# Patient Record
Sex: FEMALE | Race: WHITE | Employment: OTHER | URBAN - METROPOLITAN AREA
[De-identification: names, ages, dates, MRNs, and addresses within clinical notes are randomized per-mention and may not be internally consistent; named-entity substitution may affect disease eponyms.]

---

## 2017-02-20 ENCOUNTER — ANESTHESIA EVENT (OUTPATIENT)
Dept: SURGERY | Age: 77
End: 2017-02-20
Payer: MEDICARE

## 2017-02-21 ENCOUNTER — ANESTHESIA (OUTPATIENT)
Dept: SURGERY | Age: 77
End: 2017-02-21
Payer: MEDICARE

## 2017-03-07 ENCOUNTER — SURGERY (OUTPATIENT)
Age: 77
End: 2017-03-07

## 2017-03-07 ENCOUNTER — HOSPITAL ENCOUNTER (OUTPATIENT)
Age: 77
Setting detail: OUTPATIENT SURGERY
Discharge: HOME | End: 2017-03-07
Attending: ORTHOPAEDIC SURGERY | Admitting: ORTHOPAEDIC SURGERY
Payer: MEDICARE

## 2017-03-07 VITALS
TEMPERATURE: 98.3 F | WEIGHT: 212.25 LBS | OXYGEN SATURATION: 94 % | DIASTOLIC BLOOD PRESSURE: 62 MMHG | RESPIRATION RATE: 16 BRPM | SYSTOLIC BLOOD PRESSURE: 128 MMHG | HEART RATE: 55 BPM | BODY MASS INDEX: 37.6 KG/M2

## 2017-03-07 DIAGNOSIS — G89.4 CHRONIC PAIN SYNDROME: ICD-10-CM

## 2017-03-07 LAB — POTASSIUM BLD-SCNC: 4.8 MMOL/L (ref 3.5–5.1)

## 2017-03-07 PROCEDURE — 77030018836 HC SOL IRR NACL ICUM -A: Performed by: ORTHOPAEDIC SURGERY

## 2017-03-07 PROCEDURE — 74011250636 HC RX REV CODE- 250/636: Performed by: ORTHOPAEDIC SURGERY

## 2017-03-07 PROCEDURE — 74011250636 HC RX REV CODE- 250/636: Performed by: ANESTHESIOLOGY

## 2017-03-07 PROCEDURE — 77030031139 HC SUT VCRL2 J&J -A: Performed by: ORTHOPAEDIC SURGERY

## 2017-03-07 PROCEDURE — 77030020754 HC CUF TRNQT 2BLA STRY -B: Performed by: ORTHOPAEDIC SURGERY

## 2017-03-07 PROCEDURE — 76210000063 HC OR PH I REC FIRST 0.5 HR: Performed by: ORTHOPAEDIC SURGERY

## 2017-03-07 PROCEDURE — 76210000020 HC REC RM PH II FIRST 0.5 HR: Performed by: ORTHOPAEDIC SURGERY

## 2017-03-07 PROCEDURE — 74011250637 HC RX REV CODE- 250/637: Performed by: ANESTHESIOLOGY

## 2017-03-07 PROCEDURE — 76060000032 HC ANESTHESIA 0.5 TO 1 HR: Performed by: ORTHOPAEDIC SURGERY

## 2017-03-07 PROCEDURE — 74011000250 HC RX REV CODE- 250: Performed by: ORTHOPAEDIC SURGERY

## 2017-03-07 PROCEDURE — 77030006605 HC BLD CRPL IN BIOM -C: Performed by: ORTHOPAEDIC SURGERY

## 2017-03-07 PROCEDURE — 76010000138 HC OR TIME 0.5 TO 1 HR: Performed by: ORTHOPAEDIC SURGERY

## 2017-03-07 PROCEDURE — 77030010507 HC ADH SKN DERMBND J&J -B: Performed by: ORTHOPAEDIC SURGERY

## 2017-03-07 PROCEDURE — 84132 ASSAY OF SERUM POTASSIUM: CPT

## 2017-03-07 PROCEDURE — 74011250636 HC RX REV CODE- 250/636

## 2017-03-07 RX ORDER — FAMOTIDINE 20 MG/1
20 TABLET, FILM COATED ORAL ONCE
Status: COMPLETED | OUTPATIENT
Start: 2017-03-07 | End: 2017-03-07

## 2017-03-07 RX ORDER — SODIUM CHLORIDE 0.9 % (FLUSH) 0.9 %
5-10 SYRINGE (ML) INJECTION EVERY 8 HOURS
Status: DISCONTINUED | OUTPATIENT
Start: 2017-03-07 | End: 2017-03-07 | Stop reason: HOSPADM

## 2017-03-07 RX ORDER — PROPOFOL 10 MG/ML
INJECTION, EMULSION INTRAVENOUS AS NEEDED
Status: DISCONTINUED | OUTPATIENT
Start: 2017-03-07 | End: 2017-03-07 | Stop reason: HOSPADM

## 2017-03-07 RX ORDER — HYDROMORPHONE HYDROCHLORIDE 2 MG/ML
0.5 INJECTION, SOLUTION INTRAMUSCULAR; INTRAVENOUS; SUBCUTANEOUS
Status: DISCONTINUED | OUTPATIENT
Start: 2017-03-07 | End: 2017-03-07 | Stop reason: HOSPADM

## 2017-03-07 RX ORDER — MIDAZOLAM HYDROCHLORIDE 1 MG/ML
2 INJECTION, SOLUTION INTRAMUSCULAR; INTRAVENOUS
Status: DISCONTINUED | OUTPATIENT
Start: 2017-03-07 | End: 2017-03-07 | Stop reason: HOSPADM

## 2017-03-07 RX ORDER — LIDOCAINE HYDROCHLORIDE 5 MG/ML
INJECTION, SOLUTION INFILTRATION; INTRAVENOUS AS NEEDED
Status: DISCONTINUED | OUTPATIENT
Start: 2017-03-07 | End: 2017-03-07 | Stop reason: HOSPADM

## 2017-03-07 RX ORDER — CEFAZOLIN SODIUM IN 0.9 % NACL 2 G/50 ML
2 INTRAVENOUS SOLUTION, PIGGYBACK (ML) INTRAVENOUS ONCE
Status: COMPLETED | OUTPATIENT
Start: 2017-03-07 | End: 2017-03-07

## 2017-03-07 RX ORDER — OXYCODONE AND ACETAMINOPHEN 10; 325 MG/1; MG/1
1 TABLET ORAL AS NEEDED
Status: DISCONTINUED | OUTPATIENT
Start: 2017-03-07 | End: 2017-03-07 | Stop reason: HOSPADM

## 2017-03-07 RX ORDER — LIDOCAINE HYDROCHLORIDE 10 MG/ML
0.3 INJECTION INFILTRATION; PERINEURAL ONCE
Status: DISCONTINUED | OUTPATIENT
Start: 2017-03-07 | End: 2017-03-07 | Stop reason: HOSPADM

## 2017-03-07 RX ORDER — LIDOCAINE HYDROCHLORIDE AND EPINEPHRINE 10; 10 MG/ML; UG/ML
INJECTION, SOLUTION INFILTRATION; PERINEURAL AS NEEDED
Status: DISCONTINUED | OUTPATIENT
Start: 2017-03-07 | End: 2017-03-07 | Stop reason: HOSPADM

## 2017-03-07 RX ORDER — SODIUM CHLORIDE, SODIUM LACTATE, POTASSIUM CHLORIDE, CALCIUM CHLORIDE 600; 310; 30; 20 MG/100ML; MG/100ML; MG/100ML; MG/100ML
100 INJECTION, SOLUTION INTRAVENOUS CONTINUOUS
Status: DISCONTINUED | OUTPATIENT
Start: 2017-03-07 | End: 2017-03-07 | Stop reason: HOSPADM

## 2017-03-07 RX ORDER — HYDROCODONE BITARTRATE AND ACETAMINOPHEN 10; 325 MG/1; MG/1
1 TABLET ORAL
Qty: 20 TAB | Refills: 0 | Status: SHIPPED | OUTPATIENT
Start: 2017-03-07 | End: 2017-03-09 | Stop reason: SDUPTHER

## 2017-03-07 RX ORDER — OXYCODONE HYDROCHLORIDE 5 MG/1
5 TABLET ORAL
Status: DISCONTINUED | OUTPATIENT
Start: 2017-03-07 | End: 2017-03-07 | Stop reason: HOSPADM

## 2017-03-07 RX ORDER — SODIUM CHLORIDE 0.9 % (FLUSH) 0.9 %
5-10 SYRINGE (ML) INJECTION AS NEEDED
Status: DISCONTINUED | OUTPATIENT
Start: 2017-03-07 | End: 2017-03-07 | Stop reason: HOSPADM

## 2017-03-07 RX ORDER — SODIUM CHLORIDE, SODIUM LACTATE, POTASSIUM CHLORIDE, CALCIUM CHLORIDE 600; 310; 30; 20 MG/100ML; MG/100ML; MG/100ML; MG/100ML
75 INJECTION, SOLUTION INTRAVENOUS CONTINUOUS
Status: DISCONTINUED | OUTPATIENT
Start: 2017-03-07 | End: 2017-03-07 | Stop reason: HOSPADM

## 2017-03-07 RX ORDER — HYDROCODONE BITARTRATE AND ACETAMINOPHEN 10; 325 MG/1; MG/1
1 TABLET ORAL
Qty: 240 TAB | Refills: 0 | Status: SHIPPED | OUTPATIENT
Start: 2017-03-07 | End: 2017-03-07

## 2017-03-07 RX ORDER — OXYCODONE HYDROCHLORIDE 5 MG/1
10 TABLET ORAL
Status: DISCONTINUED | OUTPATIENT
Start: 2017-03-07 | End: 2017-03-07 | Stop reason: HOSPADM

## 2017-03-07 RX ORDER — PROPOFOL 10 MG/ML
INJECTION, EMULSION INTRAVENOUS
Status: DISCONTINUED | OUTPATIENT
Start: 2017-03-07 | End: 2017-03-07 | Stop reason: HOSPADM

## 2017-03-07 RX ADMIN — SODIUM CHLORIDE, SODIUM LACTATE, POTASSIUM CHLORIDE, AND CALCIUM CHLORIDE 75 ML/HR: 600; 310; 30; 20 INJECTION, SOLUTION INTRAVENOUS at 10:20

## 2017-03-07 RX ADMIN — CEFAZOLIN 2 G: 1 INJECTION, POWDER, FOR SOLUTION INTRAMUSCULAR; INTRAVENOUS; PARENTERAL at 11:42

## 2017-03-07 RX ADMIN — PROPOFOL 100 MCG/KG/MIN: 10 INJECTION, EMULSION INTRAVENOUS at 11:31

## 2017-03-07 RX ADMIN — LIDOCAINE HYDROCHLORIDE 30 ML: 5 INJECTION, SOLUTION INFILTRATION; INTRAVENOUS at 11:36

## 2017-03-07 RX ADMIN — LIDOCAINE HYDROCHLORIDE AND EPINEPHRINE 4 ML: 10; 10 INJECTION, SOLUTION INFILTRATION; PERINEURAL at 11:47

## 2017-03-07 RX ADMIN — FAMOTIDINE 20 MG: 20 TABLET ORAL at 10:20

## 2017-03-07 RX ADMIN — PROPOFOL 30 MG: 10 INJECTION, EMULSION INTRAVENOUS at 11:31

## 2017-03-07 RX ADMIN — PROPOFOL 20 MG: 10 INJECTION, EMULSION INTRAVENOUS at 11:46

## 2017-03-07 NOTE — ANESTHESIA PREPROCEDURE EVALUATION
Anesthetic History   No history of anesthetic complications            Review of Systems / Medical History  Patient summary reviewed and pertinent labs reviewed    Pulmonary    COPD: mild               Neuro/Psych   Within defined limits           Cardiovascular    Hypertension: well controlled      CHF: NYHA Classification I        Exercise tolerance: >4 METS     GI/Hepatic/Renal     GERD: well controlled    Renal disease: CRI       Endo/Other        Morbid obesity and arthritis     Other Findings            Physical Exam    Airway  Mallampati: II  TM Distance: > 6 cm  Neck ROM: normal range of motion   Mouth opening: Normal     Cardiovascular    Rhythm: regular           Dental    Dentition: Upper partial plate     Pulmonary                 Abdominal  GI exam deferred       Other Findings            Anesthetic Plan    ASA: 3  Anesthesia type: regional - Harshil block          Induction: Intravenous  Anesthetic plan and risks discussed with: Patient

## 2017-03-07 NOTE — ANESTHESIA POSTPROCEDURE EVALUATION
Post-Anesthesia Evaluation and Assessment    Patient: Ronaldo Almonte MRN: [id number]  SSN: [id number]    YOB: 1940  Age: 64 y.o. Sex: female       Cardiovascular Function/Vital Signs  Visit Vitals    /62    Pulse (!) 55    Temp 36.8 °C (98.3 °F)    Resp 16    Wt 96.3 kg (212 lb 4 oz)    SpO2 94%    BMI 37.6 kg/m2       Patient is status post regional anesthesia for Procedure(s):  HAND CARPAL TUNNEL RELEASE/ RIGHT. Nausea/Vomiting: None    Postoperative hydration reviewed and adequate. Pain:  Pain Scale 1: Numeric (0 - 10) (03/07/17 1222)  Pain Intensity 1: 0 (03/07/17 1222)   Managed    Neurological Status:   Neuro (WDL): Within Defined Limits (03/07/17 1222)  Neuro  Neurologic State: Alert (03/07/17 1222)  LUE Motor Response: Purposeful (03/07/17 1222)  LLE Motor Response: Purposeful (03/07/17 1222)  RUE Motor Response: Numbness; Pharmocologically paralyzed (03/07/17 1222)  RLE Motor Response: Purposeful (03/07/17 1222)       Mental Status and Level of Consciousness: Awake. Pulmonary Status:   O2 Device: Room air (03/07/17 1228)   Adequate oxygenation and airway patent    Complications related to anesthesia: None    Post-anesthesia assessment completed.  No concerns    Signed By: Billy Vance MD     March 7, 2017

## 2017-03-07 NOTE — ANESTHESIA PROCEDURE NOTES
Peripheral Block    Start time: 3/7/2017 11:33 AM  End time: 3/7/2017 11:37 AM  Performed by: Talita Whitman  Authorized by: Veena Catalan       Pre-procedure:    Indications: primary anesthetic    Preanesthetic Checklist: patient identified, risks and benefits discussed, site marked, timeout performed, anesthesia consent given and patient being monitored    Timeout Time: 11:33          Block Type:   Block Type:  Harshil block  Laterality:  Right  Patient Position:  Flat  Block Limb IV Checked: Yes    Esmarch exsanguination: Yes    Tourniquet Type:  Single  Tourniquet Location:  Right arm  Tourniquet Inflated:  3/7/2017 11:34 AM  Inflation (mmHg):  250  Limb w/o Radial Pulse: Yes    Infused Agent:  Lidocaine 0.5%  Volume Infused (mL):  30  Tourniquet Deflated:  3/7/2017 11:59 AM    Assessment:    Injection Assessment:   Patient tolerance:  Patient tolerated the procedure well with no immediate complications

## 2017-03-07 NOTE — OP NOTES
Carpal Tunnel Release    Milton Liang     3/7/2017    Indications: This is a 64 y.o. female who presents with right carpal tunnel syndrome. The patient was admitted for surgery as conservative measures have failed. Pre-operative Diagnosis:  RIGHT CARPAL TUNNEL SYNDROME    Post-operative Diagnosis:  RIGHT CARPAL TUNNEL SYNDROME    Procedure:   RIGHT CARPAL TUNNEL RELEASE    Surgeon: Yolanda Bellamy MD    Anesthesia:   IV Regional    Procedure/Operative Note:  Appropriate informed consent was obtained previously in the office. The patient was given IV prophylactic antibiotics. A Harshil block was given in the  right upper extremity. THe extremity was prepped and draped as a sterile field. A timeout was completed identifying the patient, procedure site and surgeon. Once confirmed by the operative team we proceeded. A slightly curved longitudinal incision was then made in the  right proximal palm. Dissection was carried down to the subcutaneous tissue. A small self retaining retractor was inserted and the palmar fascia opened with the tip of the scalpel. The transverse ligament was incised over 5-6 mm at the distal margin and the wrist then moderately extended over a folded towel.,  The SEWORKS system was used to complete the release. First the single  was passed deep to the deep ligament into the carpal canal.  This was followed by the stripper instrument and then the double . Finally the ligamentatome was used to release the ligament from distal to proximal with a single smooth pass. The completeness of the release was checked by palpating with the single  as well as visually. The median nerve was intact and no masses or other abnormalities noted. Small fascial bands distally were released with the tenotomy scissors. The margins of the canal were injected with 1% Lidocaine with epinephrine. The incision was closed with 4-0 Vicryl Rapide and surgical adhesive.   Sterile dressings were applied. The patient tolerated the procedure well and was sent to the  in good condition.      Tourniquet Time:   Total Tourniquet Time Documented:  Forearm (Right) - 24 minutes  Total: Forearm (Right) - 24 minutes          Signed By: Jennifer Taylor MD

## 2017-03-07 NOTE — IP AVS SNAPSHOT
[facility] 
 
 
 [address] 
[phone] Patient: Thiago Hoff MRN: [id number] XES:4/7/0210 You are allergic to the following Allergen Reactions Latex Rash Altace (Ramipril) Cough Ambien (Zolpidem) Other (comments) Bad dreams Codeine Phosphate Other (comments) Hives, can't breathe Lescol Xl (Fluvastatin) Other (comments) \"also caused troubles\" Lipitor (Atorvastatin) Unknown (comments) \"Can't take even low doses\" Micardis (Telmisartan) Vertigo Oxybutynin Chloride Other (comments) \"Dried mouth for 1 week\" Praluent Pen (Alirocumab) Other (comments) Runny nose, irritation around the nose Vytorin 10-10 (Ezetimibe-Simvastatin) Other (comments) Upper abd stomach cramps Zetia (Ezetimibe) Diarrhea Zocor (Simvastatin) Other (comments) Abd cramps Recent Documentation Weight BMI OB Status Smoking Status 96.3 kg 37.6 kg/m2 Menopause Former Smoker Emergency Contacts Name Discharge Info Relation Home Work Mobile Candida Awan  Friend [5] [phone] About your hospitalization You were admitted on:  March 7, 2017 [facility] You were discharged on:  March 7, 2017 Unit phone number:  [phone] Why you were hospitalized Your primary diagnosis was:  Not on File Providers Seen During Your Hospitalizations Provider Role Specialty Primary office phone Campos Clark MD Attending Provider Orthopedic Surgery [phone] Your Primary Care Physician (PCP) Primary Care Physician Office Phone Office Fax 6282 [address] [phone] Follow-up Information Follow up With Details Comments Contact Info Doreen Venegas MD   [address] [facility] 
 Adama mcguire [address] [place] [address] 
[phone] Your Appointments Thursday March 09, 2017  9:30 AM EST Office Visit with Geovanny Guerra MD  
[address] [place] [address] ([address] [place] [address]) [address] [facility] Adama mcguire [address] [place] [address]  
[phone] Current Discharge Medication List  
  
CONTINUE these medications which have CHANGED Dose & Instructions Dispensing Information Comments Morning Noon Evening Bedtime  
 bisoprolol 5 mg tablet Commonly known as:  Clearance Kidd What changed:  when to take this Your next dose is: Today, Tomorrow Other:  _________ Dose:  5 mg Take 1 Tab by mouth daily. Indications: Hypertension Quantity:  90 Tab Refills:  3 HYDROcodone-acetaminophen  mg tablet Commonly known as:  Martha Fothergill What changed:   
- how much to take - when to take this Your next dose is: Today, Tomorrow Other:  _________ Dose:  1 Tab Take 1 Tab by mouth every four (4) hours as needed for Pain. Max Daily Amount: 6 Tabs. Quantity:  20 Tab Refills:  0  
     
   
   
   
  
 magnesium oxide 400 mg tablet Commonly known as:  MAG-OX What changed:  when to take this Your next dose is: Today, Tomorrow Other:  _________ Dose:  400 mg Take 1 Tab by mouth daily. Quantity:  90 Tab Refills:  3  
     
   
   
   
  
 nystatin powder Commonly known as:  NYSTOP What changed:  additional instructions Your next dose is: Today, Tomorrow Other:  _________ Apply  to affected area four (4) times daily. Quantity:  120 g Refills:  11  
     
   
   
   
  
 phentermine 37.5 mg tablet Commonly known as:  ADIPEX-P What changed:  additional instructions Your next dose is: Today, Tomorrow Other:  _________ Dose:  37.5 mg Take 1 Tab by mouth every morning. Max Daily Amount: 37.5 mg.  
 Quantity:  30 Tab Refills:  2 CONTINUE these medications which have NOT CHANGED Dose & Instructions Dispensing Information Comments Morning Noon Evening Bedtime ALPRAZolam 2 mg tablet Commonly known as:  Yasir Smith Your next dose is: Today, Tomorrow Other:  _________ Dose:  2-4 mg Take 1-2 Tabs by mouth nightly as needed. Max Daily Amount: 4 mg. Quantity:  60 Tab Refills:  5  
     
   
   
   
  
 amLODIPine 5 mg tablet Commonly known as:  Andrés Mend Your next dose is: Today, Tomorrow Other:  _________ TK 1 T PO DAILY TO LOWER BLOOD PRESSURE  hs  
 Refills:  4  
     
   
   
   
  
 aspirin 81 mg tablet Your next dose is: Today, Tomorrow Other:  _________ Dose:  81 mg Take 81 mg by mouth nightly. Last dose 2/19/17 Refills:  0  
     
   
   
   
  
 BIOTIN PO Your next dose is: Today, Tomorrow Other:  _________ Dose:  5000 mcg Take 5,000 mcg by mouth. 1 tablet daily Refills:  0  
     
   
   
   
  
 calcium carbonate 500 mg calcium (1,250 mg) tablet Commonly known as:  OS-ROSALIA Your next dose is: Today, Tomorrow Other:  _________ Take  by mouth daily. Refills:  0  
     
   
   
   
  
 coconut oil 1,000 mg Cap Your next dose is: Today, Tomorrow Other:  _________ Dose:  1000 mg Take 1,000 mg by mouth. 1 capsule daily   Last dose 2/19/17 Refills:  0  
     
   
   
   
  
 FISH OIL PO Your next dose is: Today, Tomorrow Other:  _________ Dose:  1400 mg Take 1,400 mg by mouth. Fish Oil Triple Strength, 1 capsule in the morning and 1 in the evening   Last dose 2/19/17 Refills:  0  
     
   
   
   
  
 furosemide 40 mg tablet Commonly known as:  LASIX Your next dose is: Today, Tomorrow Other:  _________ Dose:  40 mg Take 40 mg by mouth every morning. Refills:  0 L-LYSINE PO Your next dose is: Today, Tomorrow Other:  _________ Dose:  1000 mg Take 1,000 mg by mouth. One tablet daily Refills:  0 MEGARED OMEGA-3 KRILL OIL PO Your next dose is: Today, Tomorrow Other:  _________ Take  by mouth every morning. Last dose 2/19/17 Refills:  0  
     
   
   
   
  
 ondansetron hcl 4 mg tablet Commonly known as:  Sil Dice Your next dose is: Today, Tomorrow Other:  _________ Dose:  4 mg Take 4 mg by mouth three (3) times daily as needed. Refills:  0  
     
   
   
   
  
 REPATHA SURECLICK pen injection Generic drug:  evolocumab Your next dose is: Today, Tomorrow Other:  _________  
   
   
 by SubCUTAneous route. Every other week  Indications: hypercholesterolemia Refills:  0  
     
   
   
   
  
 VITAMIN D3 1,000 unit tablet Generic drug:  cholecalciferol Your next dose is: Today, Tomorrow Other:  _________ Dose:  1000 Units Take 1,000 Units by mouth daily. Refills:  0  
     
   
   
   
  
 vitamin E 400 unit capsule Commonly known as:  [address] Your next dose is: Today, Tomorrow Other:  _________ Take  by mouth daily. Last dose 2/19/17 Refills:  0 Where to Get Your Medications Information on where to get these meds will be given to you by the nurse or doctor. ! Ask your nurse or doctor about these medications HYDROcodone-acetaminophen  mg tablet Discharge Instructions POST OP INSTRUCTIONS 1. Patient has appointment for 3/17/17 at 9:50 AM at the [address] [place] [address]. 2.  For problems call Dr Familia Hahn, [facility],  [phone] Appointments only,  [phone] 
 
3. Ice and elevate the surgical site. 4.  May remove dressings and wash in running water or shower.   Then cover with a Band-aid. Do not submerge in bath or dish water. ACTIVITY · As tolerated and as directed by your doctor. · Bathe or shower as directed by your doctor. DIET · Clear liquids until no nausea or vomiting; then light diet for the first day. · Advance to regular diet on second day, unless your doctor orders otherwise. · If nausea and vomiting continues, call your doctor. PAIN 
· Take pain medication as directed by your doctor. · Call your doctor if pain is NOT relieved by medication. · DO NOT take aspirin of blood thinners unless directed by your doctor. DRESSING CARE  
 
 
CALL YOUR DOCTOR IF  
· Excessive bleeding that does not stop after holding pressure over the area · Temperature of 101 degrees F or above · Excessive redness, swelling or bruising, and/ or green or yellow, smelly discharge from incision AFTER ANESTHESIA · For the first 24 hours: DO NOT Drive, Drink alcoholic beverages, or Make important decisions. · Be aware of dizziness following anesthesia and while taking pain medication. APPOINTMENT DATE/ TIME 
 
YOUR DOCTOR'S PHONE NUMBER  
 
 
DISCHARGE SUMMARY from Nurse PATIENT INSTRUCTIONS: 
 
After general anesthesia or intravenous sedation, for 24 hours or while taking prescription Narcotics: · Limit your activities · Do not drive and operate hazardous machinery · Do not make important personal or business decisions · Do  not drink alcoholic beverages · If you have not urinated within 8 hours after discharge, please contact your surgeon on call. *  Please give a list of your current medications to your Primary Care Provider. *  Please update this list whenever your medications are discontinued, doses are 
    changed, or new medications (including over-the-counter products) are added. *  Please carry medication information at all times in case of emergency situations. These are general instructions for a healthy lifestyle: No smoking/ No tobacco products/ Avoid exposure to second hand smoke Surgeon General's Warning:  Quitting smoking now greatly reduces serious risk to your health. Obesity, smoking, and sedentary lifestyle greatly increases your risk for illness A healthy diet, regular physical exercise & weight monitoring are important for maintaining a healthy lifestyle You may be retaining fluid if you have a history of heart failure or if you experience any of the following symptoms:  Weight gain of 3 pounds or more overnight or 5 pounds in a week, increased swelling in our hands or feet or shortness of breath while lying flat in bed. Please call your doctor as soon as you notice any of these symptoms; do not wait until your next office visit. Recognize signs and symptoms of STROKE: 
 
F-face looks uneven A-arms unable to move or move unevenly S-speech slurred or non-existent T-time-call 911 as soon as signs and symptoms begin-DO NOT go Back to bed or wait to see if you get better-TIME IS BRAIN. Discharge Orders None Introducing [facility]! Dear Karen Justice: 
Thank you for requesting a [employer] account. Our records indicate that you already have an active [employer] account. You can access your account anytime at [email or web address] Did you know that you can access your hospital and ER discharge instructions at any time in [employer]? You can also review all of your test results from your hospital stay or ER visit. Additional Information If you have questions, please visit the Frequently Asked Questions section of the [employer] website at [email or web address] Remember, [employer] is NOT to be used for urgent needs. For medical emergencies, dial 911. Now available from your iPhone and Android! General Information Please provide this summary of care documentation to your next provider. Patient Signature:  ____________________________________________________________ Date:  ____________________________________________________________  
  
Henrry Saskia Provider Signature:  ____________________________________________________________ Date:  ____________________________________________________________

## 2017-03-07 NOTE — DISCHARGE INSTRUCTIONS
POST OP INSTRUCTIONS      1. Patient has appointment for 3/17/17 at 9:50 AM at the [address] [place] [address]. 2.  For problems call Dr Sergei Zhou, [address] [facility] [address],  [phone]          Appointments only,  [phone]    3. Ice and elevate the surgical site. 4.  May remove dressings and wash in running water or shower. Then cover with a Band-aid. Do not submerge in bath or dish water. ACTIVITY  · As tolerated and as directed by your doctor. · Bathe or shower as directed by your doctor. DIET  · Clear liquids until no nausea or vomiting; then light diet for the first day. · Advance to regular diet on second day, unless your doctor orders otherwise. · If nausea and vomiting continues, call your doctor. PAIN  · Take pain medication as directed by your doctor. · Call your doctor if pain is NOT relieved by medication. · DO NOT take aspirin of blood thinners unless directed by your doctor. DRESSING CARE       CALL YOUR DOCTOR IF   · Excessive bleeding that does not stop after holding pressure over the area  · Temperature of 101 degrees F or above  · Excessive redness, swelling or bruising, and/ or green or yellow, smelly discharge from incision    AFTER ANESTHESIA   · For the first 24 hours: DO NOT Drive, Drink alcoholic beverages, or Make important decisions. · Be aware of dizziness following anesthesia and while taking pain medication. APPOINTMENT DATE/ TIME    YOUR DOCTOR'S PHONE NUMBER       DISCHARGE SUMMARY from Nurse    PATIENT INSTRUCTIONS:    After general anesthesia or intravenous sedation, for 24 hours or while taking prescription Narcotics:  · Limit your activities  · Do not drive and operate hazardous machinery  · Do not make important personal or business decisions  · Do  not drink alcoholic beverages  · If you have not urinated within 8 hours after discharge, please contact your surgeon on call.     *  Please give a list of your current medications to your Primary Care Provider. *  Please update this list whenever your medications are discontinued, doses are      changed, or new medications (including over-the-counter products) are added. *  Please carry medication information at all times in case of emergency situations. These are general instructions for a healthy lifestyle:    No smoking/ No tobacco products/ Avoid exposure to second hand smoke    Surgeon General's Warning:  Quitting smoking now greatly reduces serious risk to your health. Obesity, smoking, and sedentary lifestyle greatly increases your risk for illness    A healthy diet, regular physical exercise & weight monitoring are important for maintaining a healthy lifestyle    You may be retaining fluid if you have a history of heart failure or if you experience any of the following symptoms:  Weight gain of 3 pounds or more overnight or 5 pounds in a week, increased swelling in our hands or feet or shortness of breath while lying flat in bed. Please call your doctor as soon as you notice any of these symptoms; do not wait until your next office visit. Recognize signs and symptoms of STROKE:    F-face looks uneven    A-arms unable to move or move unevenly    S-speech slurred or non-existent    T-time-call 911 as soon as signs and symptoms begin-DO NOT go       Back to bed or wait to see if you get better-TIME IS BRAIN.

## 2017-05-17 ENCOUNTER — HOSPITAL ENCOUNTER (OUTPATIENT)
Dept: MAMMOGRAPHY | Age: 77
Discharge: HOME | End: 2017-05-17
Attending: FAMILY MEDICINE
Payer: MEDICARE

## 2017-05-17 DIAGNOSIS — Z12.31 VISIT FOR SCREENING MAMMOGRAM: ICD-10-CM

## 2017-05-17 PROCEDURE — 77067 SCR MAMMO BI INCL CAD: CPT

## 2017-05-24 ENCOUNTER — HOSPITAL ENCOUNTER (OUTPATIENT)
Dept: MAMMOGRAPHY | Age: 77
Discharge: HOME | End: 2017-05-24
Attending: FAMILY MEDICINE
Payer: MEDICARE

## 2017-05-24 DIAGNOSIS — N64.89 BREAST ASYMMETRY: ICD-10-CM

## 2017-05-24 DIAGNOSIS — R92.8 ABNORMAL SCREENING MAMMOGRAM: ICD-10-CM

## 2017-05-24 PROCEDURE — 77065 DX MAMMO INCL CAD UNI: CPT

## 2017-05-24 PROCEDURE — 76642 ULTRASOUND BREAST LIMITED: CPT

## 2017-05-30 ENCOUNTER — HOSPITAL ENCOUNTER (OUTPATIENT)
Dept: MAMMOGRAPHY | Age: 77
Discharge: HOME | End: 2017-05-30
Attending: FAMILY MEDICINE
Payer: MEDICARE

## 2017-05-30 VITALS
HEART RATE: 74 BPM | TEMPERATURE: 96.6 F | OXYGEN SATURATION: 94 % | DIASTOLIC BLOOD PRESSURE: 65 MMHG | SYSTOLIC BLOOD PRESSURE: 140 MMHG

## 2017-05-30 DIAGNOSIS — C50.911 BREAST CANCER, RIGHT BREAST (HCC): ICD-10-CM

## 2017-05-30 PROCEDURE — 74011250636 HC RX REV CODE- 250/636: Performed by: FAMILY MEDICINE

## 2017-05-30 PROCEDURE — 77030019999 MAM STEREO VAC  BX BREAST RT 1ST LESION W/CLIP AND SPECIMEN

## 2017-05-30 PROCEDURE — 74011000250 HC RX REV CODE- 250: Performed by: FAMILY MEDICINE

## 2017-05-30 PROCEDURE — 88305 TISSUE EXAM BY PATHOLOGIST: CPT | Performed by: FAMILY MEDICINE

## 2017-05-30 PROCEDURE — 77065 DX MAMMO INCL CAD UNI: CPT

## 2017-05-30 RX ORDER — LIDOCAINE HYDROCHLORIDE 10 MG/ML
10 INJECTION INFILTRATION; PERINEURAL
Status: COMPLETED | OUTPATIENT
Start: 2017-05-30 | End: 2017-05-30

## 2017-05-30 RX ORDER — LIDOCAINE HYDROCHLORIDE AND EPINEPHRINE 10; 10 MG/ML; UG/ML
1.5 INJECTION, SOLUTION INFILTRATION; PERINEURAL
Status: COMPLETED | OUTPATIENT
Start: 2017-05-30 | End: 2017-05-30

## 2017-05-30 RX ORDER — LIDOCAINE HYDROCHLORIDE AND EPINEPHRINE 10; 10 MG/ML; UG/ML
10 INJECTION, SOLUTION INFILTRATION; PERINEURAL
Status: COMPLETED | OUTPATIENT
Start: 2017-05-30 | End: 2017-05-30

## 2017-05-30 RX ADMIN — SODIUM CHLORIDE 250 ML: 900 INJECTION, SOLUTION INTRAVENOUS at 10:11

## 2017-05-30 RX ADMIN — LIDOCAINE HYDROCHLORIDE,EPINEPHRINE BITARTRATE 10 ML: 10; .01 INJECTION, SOLUTION INFILTRATION; PERINEURAL at 10:10

## 2017-05-30 RX ADMIN — LIDOCAINE HYDROCHLORIDE 6 ML: 10 INJECTION, SOLUTION INFILTRATION; PERINEURAL at 10:09

## 2018-05-29 ENCOUNTER — HOSPITAL ENCOUNTER (OUTPATIENT)
Dept: MAMMOGRAPHY | Age: 78
Discharge: HOME | End: 2018-05-29
Attending: FAMILY MEDICINE
Payer: MEDICARE

## 2018-05-29 DIAGNOSIS — Z12.31 ENCOUNTER FOR SCREENING MAMMOGRAM FOR MALIGNANT NEOPLASM OF BREAST: ICD-10-CM

## 2018-05-29 PROCEDURE — 77067 SCR MAMMO BI INCL CAD: CPT

## 2018-06-19 ENCOUNTER — HOSPITAL ENCOUNTER (OUTPATIENT)
Dept: LAB | Age: 78
Discharge: HOME | End: 2018-06-19

## 2018-06-19 PROCEDURE — 88305 TISSUE EXAM BY PATHOLOGIST: CPT | Performed by: INTERNAL MEDICINE

## 2018-11-03 ENCOUNTER — HOSPITAL ENCOUNTER (EMERGENCY)
Age: 78
Discharge: HOME | End: 2018-11-03
Attending: EMERGENCY MEDICINE
Payer: MEDICARE

## 2018-11-03 ENCOUNTER — APPOINTMENT (OUTPATIENT)
Dept: GENERAL RADIOLOGY | Age: 78
End: 2018-11-03
Attending: NURSE PRACTITIONER
Payer: MEDICARE

## 2018-11-03 VITALS
HEIGHT: 63 IN | SYSTOLIC BLOOD PRESSURE: 158 MMHG | HEART RATE: 75 BPM | TEMPERATURE: 98 F | BODY MASS INDEX: 38.8 KG/M2 | DIASTOLIC BLOOD PRESSURE: 73 MMHG | WEIGHT: 219 LBS | RESPIRATION RATE: 20 BRPM | OXYGEN SATURATION: 96 %

## 2018-11-03 DIAGNOSIS — M54.2 NECK PAIN ON LEFT SIDE: Primary | ICD-10-CM

## 2018-11-03 PROCEDURE — 99283 EMERGENCY DEPT VISIT LOW MDM: CPT | Performed by: NURSE PRACTITIONER

## 2018-11-03 PROCEDURE — 72050 X-RAY EXAM NECK SPINE 4/5VWS: CPT

## 2018-11-03 RX ORDER — LIDOCAINE 50 MG/G
PATCH TOPICAL
Qty: 15 EACH | Refills: 0 | Status: SHIPPED | OUTPATIENT
Start: 2018-11-03 | End: 2021-11-04

## 2018-11-03 RX ORDER — DIAZEPAM 2 MG/1
2 TABLET ORAL
Qty: 14 TAB | Refills: 0 | Status: SHIPPED | OUTPATIENT
Start: 2018-11-03 | End: 2020-01-14

## 2018-11-03 NOTE — DISCHARGE INSTRUCTIONS
Rest, use the Lidoderm patch as prescribed as needed for pain and urinary. Take the Valium as prescribed to help alleviate muscle pain. Do not take the Valium at the same time your taking Xanax, I recommend taking it at lunch and in the morning. Follow up with her primary doctor as discussed for further evaluation and treatment. Return to the ED for any new or worsening symptoms, including difficulty moving her neck, chest pain, left arm pain, or passing out. Neck Pain: Care Instructions  Your Care Instructions    You can have neck pain anywhere from the bottom of your head to the top of your shoulders. It can spread to the upper back or arms. Injuries, painting a ceiling, sleeping with your neck twisted, staying in one position for too long, and many other activities can cause neck pain. Most neck pain gets better with home care. Your doctor may recommend medicine to relieve pain or relax your muscles. He or she may suggest exercise and physical therapy to increase flexibility and relieve stress. You may need to wear a special (cervical) collar to support your neck for a day or two. Follow-up care is a key part of your treatment and safety. Be sure to make and go to all appointments, and call your doctor if you are having problems. It's also a good idea to know your test results and keep a list of the medicines you take. How can you care for yourself at home? · Try using a heating pad on a low or medium setting for 15 to 20 minutes every 2 or 3 hours. Try a warm shower in place of one session with the heating pad. · You can also try an ice pack for 10 to 15 minutes every 2 to 3 hours. Put a thin cloth between the ice and your skin. · Take pain medicines exactly as directed. ¨ If the doctor gave you a prescription medicine for pain, take it as prescribed. ¨ If you are not taking a prescription pain medicine, ask your doctor if you can take an over-the-counter medicine.   · If your doctor recommends a cervical collar, wear it exactly as directed. When should you call for help? Call your doctor now or seek immediate medical care if:  ? · You have new or worsening numbness in your arms, buttocks or legs. ? · You have new or worsening weakness in your arms or legs. (This could make it hard to stand up.)   ? · You lose control of your bladder or bowels. ? Watch closely for changes in your health, and be sure to contact your doctor if:  ? · Your neck pain is getting worse. ? · You are not getting better after 1 week. ? · You do not get better as expected. Where can you learn more? Go to http://veronica-clarence.info/. Enter [id number] in the search box to learn more about \"Neck Pain: Care Instructions. \"  Current as of: March 21, 2017  Content Version: 11.5  © 5701-5811 [employer]. Care instructions adapted under license by [employer] (which disclaims liability or warranty for this information). If you have questions about a medical condition or this instruction, always ask your healthcare professional. [address] any warranty or liability for your use of this information.

## 2018-11-03 NOTE — ED NOTES
I have reviewed discharge instructions with the patient. The patient verbalized understanding. Patient left ED via Discharge Method: ambulatory to Home with self Opportunity for questions and clarification provided. Patient given 2 scripts. To continue your aftercare when you leave the hospital, you may receive an automated call from our care team to check in on how you are doing. This is a free service and part of our promise to provide the best care and service to meet your aftercare needs.  If you have questions, or wish to unsubscribe from this service please call [phone]. Thank you for Choosing our [facility] Emergency Department.

## 2018-11-03 NOTE — ED PROVIDER NOTES
This patient presents to the ED complaining of left cervical neck pain ongoing for a week, and denies any known injury. She states that she has tried multiple over-the-counter therapies, including icy hot and BenGay, and has tried taking Flexeril and Norco, without any relief of her symptoms. The history is provided by the patient. No  was used. Neck Pain This is a new problem. The current episode started more than 2 days ago. The problem occurs constantly. The problem has been gradually worsening. The pain is associated with nothing. There has been no fever. The pain is present in the left side. The quality of the pain is described as aching and cramping. The pain radiates to the left scapula and left shoulder. The pain is at a severity of 8/10. The pain is severe. The symptoms are aggravated by certain positions. The pain is worse during the night. Stiffness is present all day. Pertinent negatives include no chest pain, no syncope, no numbness, no headaches, no leg pain and no tingling. She has tried ice, heat, muscle relaxants, NSAIDs and analgesics for the symptoms. The treatment provided no relief. Past Medical History:  
Diagnosis Date  Abnormal CXR 8/23/2016  Abnormal glucose 8/23/2016  Anemia, deficiency 8/23/2016  Antibiotic prophylaxis for dental procedure indicated due to prior joint replacement 8/23/2016  Anxiety 8/23/2016  Arthritis  Back pain 8/23/2016  Bradycardia 8/23/2016  Carotid atherosclerosis 8/23/2016  Chest discomfort  CHF (congestive heart failure) ([facility] [address] 8/23/2016  Chronic kidney disease   
 mild  Chronic pain  Congestive heart failure, unspecified  CRI (chronic renal insufficiency) 8/23/2016  Dyspnea  Elevated BNP  8/23/2016  Elevated ferritin 8/23/2016  Encounter for long-term (current) use of medications 8/23/2016  Encounter for long-term use of antiplatelets/antithrombotics 8/23/2016  Encounter for monitoring of chronic aspirin therapy  Fatigue  GERD (gastroesophageal reflux disease)   
 no more  Hyperlipidemia 8/23/2016  Hypertension  Hypertension, benign 8/23/2016  Insomnia  Leg swelling 8/23/2016  Lumbar radiculopathy, acute 8/23/2016  Malaise and fatigue 8/23/2016  Muscle cramps 8/23/2016  Nausea 8/23/2016  Neck pain, chronic 8/23/2016  Obesity 8/23/2016  Occlusion and stenosis of carotid artery 9/16/2014 L>R  
 Otitis externa, chronic 8/23/2016  Pain in right hip 8/23/2016  Pain management contract signed 08/01/2012  Positive D dimer  Postmenopausal related mood disorder 8/23/2016  Proteinuria 8/23/2016  Rhinitis 8/23/2016  
 Skin lesion  Weight gain 8/23/2016 Past Surgical History:  
Procedure Laterality Date  HX BREAST BIOPSY Right 1961  
 prior benign right breast biopsy  HX BREAST BIOPSY Right 2017  
 rt stereo bx b9  
 HX CARPAL TUNNEL RELEASE Right 2017 Dr. James Pfeiffer  HX CATARACT REMOVAL Bilateral 10/2008 Dr. Jesus Lima at [facility]    
 surgery to open fallopian tubes  HX HIP REPLACEMENT Left 07/03/2007 [employer] Implant-she has a card; had blood transfusion  HX LAP CHOLECYSTECTOMY  HX ORTHOPAEDIC Right 2003  
 hand [address] Family History:  
Problem Relation Age of Onset  Heart Disease Mother CHF  Hypertension Mother  Stroke Mother   
     age 66  Cancer Father  Hypertension Sister  Hypertension Brother Social History Socioeconomic History  Marital status:  Spouse name: Not on file  Number of children: Not on file  Years of education: Not on file  Highest education level: Not on file Social Needs  Financial resource strain: Not on file  Food insecurity - worry: Not on file  Food insecurity - inability: Not on file  Transportation needs - medical: Not on file  Transportation needs - non-medical: Not on file Occupational History  Not on file Tobacco Use  Smoking status: Former Smoker Last attempt to quit: 2010 Years since quittin.1  Smokeless tobacco: Never Used  Tobacco comment: 25 pack year history Substance and Sexual Activity  Alcohol use: No  
 Drug use: No  
 Sexual activity: Not on file Other Topics Concern  Not on file Social History Narrative  Not on file ALLERGIES: Latex; Altace [ramipril]; Ambien [zolpidem]; Codeine phosphate; Lescol xl [fluvastatin]; Lipitor [atorvastatin]; Micardis [telmisartan]; Oxybutynin chloride; Praluent pen [alirocumab]; Vytorin 10-10 [ezetimibe-simvastatin]; Zetia [ezetimibe]; and Zocor [simvastatin] Review of Systems Constitutional: Negative for chills and fever. Respiratory: Negative for chest tightness, shortness of breath and wheezing. Cardiovascular: Negative for chest pain, palpitations and syncope. Gastrointestinal: Negative for nausea and vomiting. Genitourinary: Negative for dysuria and hematuria. Musculoskeletal: Positive for arthralgias and neck pain. Negative for back pain. Skin: Negative for rash and wound. Neurological: Negative for dizziness, tingling, syncope, numbness and headaches. Vitals:  
 18 1324 BP: 158/72 Pulse: 73 Resp: 20 Temp: 98 °F (36.7 °C) SpO2: 95% Weight: 99.3 kg (219 lb) Height: 5' 3\" (1.6 m) Physical Exam  
Constitutional: She is oriented to person, place, and time. She appears well-developed and well-nourished. No distress. HENT:  
Head: Normocephalic and atraumatic. Neck: No tracheal deviation present. Cardiovascular: Normal rate, normal heart sounds and intact distal pulses. Pulmonary/Chest: Effort normal. No stridor. No respiratory distress. She has no wheezes. She has no rales. Musculoskeletal: Normal range of motion. She exhibits tenderness. She exhibits no edema. Normal, active range of motion, though somewhat limited to pain in the left shoulder on abduction and abduction and posterior rotation. Mild tenderness to palpation along the left lateral trapezius. Line tenderness, bony crepitus, bruising, or deformity visible or palpable. Neurological: She is alert and oriented to person, place, and time. Skin: Skin is warm and dry. Capillary refill takes less than 2 seconds. Vitals reviewed. MDM Number of Diagnoses or Management Options Neck pain on left side: new and does not require workup Diagnosis management comments: X-ray negative for acute injury. Given the extensive attempt at therapy, I will recommend continued treatment, but will prescribe a mild muscle relaxant of Valium to take during the morning, given that her Xanax has been helping her at night. Did instruct the patient not to take them both at the same time. I also will prescribe Dilaudid or patches per the patient's request, and recommended follow-up with PCP, which she is only scheduled for on the 20th of this month. I discussed the plan of care with the patient, and she voiced full understanding and compliance. Amount and/or Complexity of Data Reviewed Tests in the radiology section of CPT®: ordered and reviewed Risk of Complications, Morbidity, and/or Mortality Presenting problems: minimal 
Diagnostic procedures: minimal 
Management options: low Patient Progress Patient progress: stable Procedures Portions of this chart were dictated using a voice-to-text program, ROSA Jensen 21. While care was taken to eliminate any dictation inaccuracies, please excuse any dictation errors.

## 2018-11-03 NOTE — ED TRIAGE NOTES
Patient co neck pain for one week, unable to move neck to either left or right. Patient denies any injury to neck.

## 2019-01-17 ENCOUNTER — HOSPITAL ENCOUNTER (EMERGENCY)
Age: 79
Discharge: HOME | End: 2019-01-17
Attending: EMERGENCY MEDICINE
Payer: MEDICARE

## 2019-01-17 ENCOUNTER — APPOINTMENT (OUTPATIENT)
Dept: GENERAL RADIOLOGY | Age: 79
End: 2019-01-17
Attending: EMERGENCY MEDICINE
Payer: MEDICARE

## 2019-01-17 VITALS
OXYGEN SATURATION: 93 % | SYSTOLIC BLOOD PRESSURE: 135 MMHG | BODY MASS INDEX: 38.8 KG/M2 | DIASTOLIC BLOOD PRESSURE: 65 MMHG | TEMPERATURE: 97.8 F | RESPIRATION RATE: 18 BRPM | HEART RATE: 64 BPM | HEIGHT: 63 IN | WEIGHT: 219 LBS

## 2019-01-17 DIAGNOSIS — J06.9 UPPER RESPIRATORY TRACT INFECTION, UNSPECIFIED TYPE: Primary | ICD-10-CM

## 2019-01-17 LAB
ALBUMIN SERPL-MCNC: 3.4 G/DL (ref 3.2–4.6)
ALBUMIN/GLOB SERPL: 0.9 {RATIO}
ALP SERPL-CCNC: 77 U/L (ref 50–130)
ALT SERPL-CCNC: 26 U/L (ref 12–65)
ANION GAP SERPL CALC-SCNC: 6 MMOL/L
AST SERPL-CCNC: 15 U/L (ref 15–37)
BASOPHILS # BLD: 0.1 K/UL (ref 0–0.2)
BASOPHILS NFR BLD: 1 % (ref 0–2)
BILIRUB SERPL-MCNC: 0.2 MG/DL (ref 0.2–1.1)
BUN SERPL-MCNC: 26 MG/DL (ref 8–23)
CALCIUM SERPL-MCNC: 8.7 MG/DL (ref 8.3–10.4)
CHLORIDE SERPL-SCNC: 105 MMOL/L (ref 98–107)
CO2 SERPL-SCNC: 31 MMOL/L (ref 21–32)
CREAT SERPL-MCNC: 1.62 MG/DL (ref 0.6–1)
DIFFERENTIAL METHOD BLD: ABNORMAL
EOSINOPHIL # BLD: 0.2 K/UL (ref 0–0.8)
EOSINOPHIL NFR BLD: 3 % (ref 0.5–7.8)
ERYTHROCYTE [DISTWIDTH] IN BLOOD BY AUTOMATED COUNT: 12.3 % (ref 11.9–14.6)
GLOBULIN SER CALC-MCNC: 3.8 G/DL (ref 2.3–3.5)
GLUCOSE SERPL-MCNC: 97 MG/DL (ref 65–100)
HCT VFR BLD AUTO: 40.5 % (ref 35.8–46.3)
HGB BLD-MCNC: 13.1 G/DL (ref 11.7–15.4)
IMM GRANULOCYTES # BLD AUTO: 0 K/UL (ref 0–0.5)
IMM GRANULOCYTES NFR BLD AUTO: 0 % (ref 0–5)
LYMPHOCYTES # BLD: 2.7 K/UL (ref 0.5–4.6)
LYMPHOCYTES NFR BLD: 38 % (ref 13–44)
MCH RBC QN AUTO: 32 PG (ref 26.1–32.9)
MCHC RBC AUTO-ENTMCNC: 32.3 G/DL (ref 31.4–35)
MCV RBC AUTO: 98.8 FL (ref 79.6–97.8)
MONOCYTES # BLD: 0.9 K/UL (ref 0.1–1.3)
MONOCYTES NFR BLD: 12 % (ref 4–12)
NEUTS SEG # BLD: 3.3 K/UL (ref 1.7–8.2)
NEUTS SEG NFR BLD: 46 % (ref 43–78)
NRBC # BLD: 0 K/UL (ref 0–0.2)
PLATELET # BLD AUTO: 192 K/UL (ref 150–450)
PMV BLD AUTO: 10.6 FL (ref 9.4–12.3)
POTASSIUM SERPL-SCNC: 4.3 MMOL/L (ref 3.5–5.1)
PROT SERPL-MCNC: 7.2 G/DL
RBC # BLD AUTO: 4.1 M/UL (ref 4.05–5.2)
SODIUM SERPL-SCNC: 142 MMOL/L (ref 136–145)
WBC # BLD AUTO: 7.2 K/UL (ref 4.3–11.1)

## 2019-01-17 PROCEDURE — 80053 COMPREHEN METABOLIC PANEL: CPT

## 2019-01-17 PROCEDURE — 85025 COMPLETE CBC W/AUTO DIFF WBC: CPT

## 2019-01-17 PROCEDURE — 99282 EMERGENCY DEPT VISIT SF MDM: CPT | Performed by: EMERGENCY MEDICINE

## 2019-01-17 PROCEDURE — 71046 X-RAY EXAM CHEST 2 VIEWS: CPT

## 2019-01-17 RX ORDER — BENZONATATE 100 MG/1
100 CAPSULE ORAL
Qty: 15 CAP | Refills: 0 | Status: SHIPPED | OUTPATIENT
Start: 2019-01-17 | End: 2019-01-22

## 2019-01-17 NOTE — DISCHARGE INSTRUCTIONS
Patient Education        Upper Respiratory Infection (Cold): Care Instructions  Your Care Instructions    An upper respiratory infection, or URI, is an infection of the nose, sinuses, or throat. URIs are spread by coughs, sneezes, and direct contact. The common cold is the most frequent kind of URI. The flu and sinus infections are other kinds of URIs. Almost all URIs are caused by viruses. Antibiotics won't cure them. But you can treat most infections with home care. This may include drinking lots of fluids and taking over-the-counter pain medicine. You will probably feel better in 4 to 10 days. The doctor has checked you carefully, but problems can develop later. If you notice any problems or new symptoms, get medical treatment right away. Follow-up care is a key part of your treatment and safety. Be sure to make and go to all appointments, and call your doctor if you are having problems. It's also a good idea to know your test results and keep a list of the medicines you take. How can you care for yourself at home? · To prevent dehydration, drink plenty of fluids, enough so that your urine is light yellow or clear like water. Choose water and other caffeine-free clear liquids until you feel better. If you have kidney, heart, or liver disease and have to limit fluids, talk with your doctor before you increase the amount of fluids you drink. · Take an over-the-counter pain medicine, such as acetaminophen (Tylenol), ibuprofen (Advil, Motrin), or naproxen (Aleve). Read and follow all instructions on the label. · Before you use cough and cold medicines, check the label. These medicines may not be safe for young children or for people with certain health problems. · Be careful when taking over-the-counter cold or flu medicines and Tylenol at the same time. Many of these medicines have acetaminophen, which is Tylenol. Read the labels to make sure that you are not taking more than the recommended dose.  Too much acetaminophen (Tylenol) can be harmful. · Get plenty of rest.  · Do not smoke or allow others to smoke around you. If you need help quitting, talk to your doctor about stop-smoking programs and medicines. These can increase your chances of quitting for good. When should you call for help? Call 911 anytime you think you may need emergency care. For example, call if:    · You have severe trouble breathing.    Call your doctor now or seek immediate medical care if:    · You seem to be getting much sicker.     · You have new or worse trouble breathing.     · You have a new or higher fever.     · You have a new rash.    Watch closely for changes in your health, and be sure to contact your doctor if:    · You have a new symptom, such as a sore throat, an earache, or sinus pain.     · You cough more deeply or more often, especially if you notice more mucus or a change in the color of your mucus.     · You do not get better as expected. Where can you learn more? Go to http://veronica-clarence.info/. Enter O931 in the search box to learn more about \"Upper Respiratory Infection (Cold): Care Instructions. \"  Current as of: December 6, 2017  Content Version: 11.8  © 1914-7289 [employer]. Care instructions adapted under license by [employer] (which disclaims liability or warranty for this information). If you have questions about a medical condition or this instruction, always ask your healthcare professional. [place] [address] any warranty or liability for your use of this information.

## 2019-01-17 NOTE — ED TRIAGE NOTES
Patient states of productive cough x3 days with green sputum. Patient denies gi/gu issues. Patient denies sob/chest pain.

## 2019-01-17 NOTE — ED PROVIDER NOTES
20-year-old white female in no underlying lung disease as had cough for the past 3 days. She states the cough is initially nonproductive however after Taking Claritin for couple days the cough became productive of a green mucus. No hemoptysis. No fever. No chest pain or shortness of breath. No tobacco use. The history is provided by the patient. Cough Pertinent negatives include no chest pain, no headaches, no shortness of breath, no wheezing and no vomiting. Past Medical History:  
Diagnosis Date  Abnormal CXR 8/23/2016  Abnormal glucose 8/23/2016  Anemia, deficiency 8/23/2016  Antibiotic prophylaxis for dental procedure indicated due to prior joint replacement 8/23/2016  Anxiety 8/23/2016  Arthritis  Back pain 8/23/2016  Bradycardia 8/23/2016  Carotid atherosclerosis 8/23/2016  Chest discomfort  CHF (congestive heart failure) ([facility] [address] 8/23/2016  Chronic kidney disease   
 mild  Chronic pain  Congestive heart failure, unspecified  CRI (chronic renal insufficiency) 8/23/2016  Dyspnea  Elevated BNP  8/23/2016  Elevated ferritin 8/23/2016  Encounter for long-term (current) use of medications 8/23/2016  Encounter for long-term use of antiplatelets/antithrombotics 8/23/2016  Encounter for monitoring of chronic aspirin therapy  Fatigue  GERD (gastroesophageal reflux disease)   
 no more  Hyperlipidemia 8/23/2016  Hypertension  Hypertension, benign 8/23/2016  Insomnia  Leg swelling 8/23/2016  Lumbar radiculopathy, acute 8/23/2016  Malaise and fatigue 8/23/2016  Muscle cramps 8/23/2016  Nausea 8/23/2016  Neck pain, chronic 8/23/2016  Obesity 8/23/2016  Occlusion and stenosis of carotid artery 9/16/2014 L>R  
 Otitis externa, chronic 8/23/2016  Pain in right hip 8/23/2016  Pain management contract signed 08/01/2012  Positive D dimer  Postmenopausal related mood disorder 2016  Proteinuria 2016  Rhinitis 2016  
 Skin lesion  Weight gain 2016 Past Surgical History:  
Procedure Laterality Date  HX BREAST BIOPSY Right   
 prior benign right breast biopsy  HX BREAST BIOPSY Right 2017  
 rt stereo bx b9  
 HX CARPAL TUNNEL RELEASE Right 2017 Dr. Celine Tavarez  HX CATARACT REMOVAL Bilateral 10/2008 Dr. Tea Veliz at [place] [facility]    
 surgery to open fallopian tubes  HX HIP REPLACEMENT Left 2007 [employer] Implant-she has a card; had blood transfusion  HX LAP CHOLECYSTECTOMY  HX ORTHOPAEDIC Right   
 hand [address] Family History:  
Problem Relation Age of Onset  Heart Disease Mother CHF  Hypertension Mother  Stroke Mother   
     age 66  Cancer Father  Hypertension Sister  Hypertension Brother Social History Socioeconomic History  Marital status:  Spouse name: Not on file  Number of children: Not on file  Years of education: Not on file  Highest education level: Not on file Social Needs  Financial resource strain: Not on file  Food insecurity - worry: Not on file  Food insecurity - inability: Not on file  Transportation needs - medical: Not on file  Transportation needs - non-medical: Not on file Occupational History  Not on file Tobacco Use  Smoking status: Former Smoker Last attempt to quit: 2010 Years since quittin.3  Smokeless tobacco: Never Used  Tobacco comment: 25 pack year history Substance and Sexual Activity  Alcohol use: No  
 Drug use: No  
 Sexual activity: Not on file Other Topics Concern  Not on file Social History Narrative  Not on file ALLERGIES: Latex; Altace [ramipril]; Ambien [zolpidem]; Codeine phosphate; Lescol xl [fluvastatin]; Lipitor [atorvastatin];  Micardis [telmisartan]; Oxybutynin chloride; Praluent pen [alirocumab]; Vytorin 10-10 [ezetimibe-simvastatin]; Zetia [ezetimibe]; and Zocor [simvastatin] Review of Systems Constitutional: Negative for fever. HENT: Positive for congestion. Respiratory: Positive for cough. Negative for shortness of breath and wheezing. Cardiovascular: Positive for leg swelling. Negative for chest pain. Gastrointestinal: Negative for abdominal pain and vomiting. Genitourinary: Negative for dysuria. Musculoskeletal: Negative for back pain. Skin: Negative for rash. Neurological: Negative for headaches. Vitals:  
 01/17/19 1400 BP: 135/65 Pulse: 64 Resp: 18 Temp: 97.8 °F (36.6 °C) SpO2: 93% Weight: 99.3 kg (219 lb) Height: 5' 3\" (1.6 m) Physical Exam  
Constitutional: She is oriented to person, place, and time. She appears well-developed and well-nourished. No distress. HENT:  
Head: Normocephalic and atraumatic. Mouth/Throat: Oropharynx is clear and moist.  
Eyes: Conjunctivae are normal. Pupils are equal, round, and reactive to light. Neck: Normal range of motion. Neck supple. Cardiovascular: Normal rate and regular rhythm. No murmur heard. Pulmonary/Chest: Effort normal and breath sounds normal. She has no wheezes. Abdominal: Soft. She exhibits no distension. There is no tenderness. Musculoskeletal: Normal range of motion. She exhibits edema. Neurological: She is alert and oriented to person, place, and time. Skin: Skin is warm and dry. Psychiatric: She has a normal mood and affect. Her behavior is normal.  
Nursing note and vitals reviewed. MDM Number of Diagnoses or Management Options Diagnosis management comments: Blood work unremarkable. Chest x-ray no acute abnormality. Patient is nontoxic in appearance. Suspect viral URI. No findings to warrant antibiotics. We'll treat with Tessalon for cough. Amount and/or Complexity of Data Reviewed Clinical lab tests: ordered and reviewed Tests in the radiology section of CPT®: ordered and reviewed Independent visualization of images, tracings, or specimens: yes Risk of Complications, Morbidity, and/or Mortality Presenting problems: moderate Diagnostic procedures: moderate Management options: low Procedures

## 2019-01-17 NOTE — ED NOTES
I have reviewed discharge instructions with the patient. The patient verbalized understanding. Patient left ED via Discharge Method: ambulatory to Home with sister. Opportunity for questions and clarification provided. Patient given 1 scripts. To continue your aftercare when you leave the hospital, you may receive an automated call from our care team to check in on how you are doing. This is a free service and part of our promise to provide the best care and service to meet your aftercare needs.  If you have questions, or wish to unsubscribe from this service please call [phone]. Thank you for Choosing our [facility] Emergency Department.

## 2019-06-17 ENCOUNTER — HOSPITAL ENCOUNTER (OUTPATIENT)
Dept: MAMMOGRAPHY | Age: 79
Discharge: HOME | End: 2019-06-17
Attending: FAMILY MEDICINE

## 2019-06-17 DIAGNOSIS — Z12.39 BREAST SCREENING, UNSPECIFIED: ICD-10-CM

## 2019-09-08 LAB
ALBUMIN SERPL-MCNC: 3.9 G/DL (ref 3.2–4.6)
ALBUMIN/GLOB SERPL: 1.1 {RATIO} (ref 1.2–3.5)
ALP SERPL-CCNC: 85 U/L (ref 50–136)
ALT SERPL-CCNC: 30 U/L (ref 12–65)
ANION GAP SERPL CALC-SCNC: 6 MMOL/L (ref 7–16)
ARTERIAL PATENCY WRIST A: NO
AST SERPL-CCNC: 17 U/L (ref 15–37)
BASE DEFICIT BLD-SCNC: 1 MMOL/L
BASOPHILS # BLD: 0 K/UL (ref 0–0.2)
BASOPHILS NFR BLD: 1 % (ref 0–2)
BDY SITE: NORMAL
BILIRUB SERPL-MCNC: 0.3 MG/DL (ref 0.2–1.1)
BODY TEMPERATURE: 98.6
BUN SERPL-MCNC: 36 MG/DL (ref 8–23)
CALCIUM SERPL-MCNC: 9.1 MG/DL (ref 8.3–10.4)
CHLORIDE SERPL-SCNC: 106 MMOL/L (ref 98–107)
CO2 BLD-SCNC: 25 MMOL/L
CO2 SERPL-SCNC: 30 MMOL/L (ref 21–32)
COLLECT TIME,HTIME: 2150
CREAT SERPL-MCNC: 1.93 MG/DL (ref 0.6–1)
DIFFERENTIAL METHOD BLD: ABNORMAL
EOSINOPHIL # BLD: 0.2 K/UL (ref 0–0.8)
EOSINOPHIL NFR BLD: 3 % (ref 0.5–7.8)
ERYTHROCYTE [DISTWIDTH] IN BLOOD BY AUTOMATED COUNT: 12.7 % (ref 11.9–14.6)
GAS FLOW.O2 O2 DELIVERY SYS: NORMAL L/MIN
GLOBULIN SER CALC-MCNC: 3.5 G/DL (ref 2.3–3.5)
GLUCOSE SERPL-MCNC: 164 MG/DL (ref 65–100)
HCO3 BLD-SCNC: 23.9 MMOL/L (ref 22–26)
HCT VFR BLD AUTO: 43 % (ref 35.8–46.3)
HGB BLD-MCNC: 14 G/DL (ref 11.7–15.4)
IMM GRANULOCYTES # BLD AUTO: 0 K/UL (ref 0–0.5)
IMM GRANULOCYTES NFR BLD AUTO: 0 % (ref 0–5)
LYMPHOCYTES # BLD: 2.8 K/UL (ref 0.5–4.6)
LYMPHOCYTES NFR BLD: 40 % (ref 13–44)
MCH RBC QN AUTO: 32.6 PG (ref 26.1–32.9)
MCHC RBC AUTO-ENTMCNC: 32.6 G/DL (ref 31.4–35)
MCV RBC AUTO: 100 FL (ref 79.6–97.8)
MONOCYTES # BLD: 0.6 K/UL (ref 0.1–1.3)
MONOCYTES NFR BLD: 9 % (ref 4–12)
NEUTS SEG # BLD: 3.3 K/UL (ref 1.7–8.2)
NEUTS SEG NFR BLD: 47 % (ref 43–78)
NRBC # BLD: 0 K/UL (ref 0–0.2)
PCO2 BLD: 39.9 MMHG (ref 35–45)
PH BLD: 7.38 [PH] (ref 7.35–7.45)
PLATELET # BLD AUTO: 186 K/UL (ref 150–450)
PMV BLD AUTO: 11.1 FL (ref 9.4–12.3)
PO2 BLD: 75 MMHG (ref 75–100)
POTASSIUM SERPL-SCNC: 3.9 MMOL/L (ref 3.5–5.1)
PROT SERPL-MCNC: 7.4 G/DL (ref 6.3–8.2)
RBC # BLD AUTO: 4.3 M/UL (ref 4.05–5.2)
SAO2 % BLD: 95 % (ref 95–98)
SERVICE CMNT-IMP: NORMAL
SODIUM SERPL-SCNC: 142 MMOL/L (ref 136–145)
SPECIMEN TYPE: NORMAL
WBC # BLD AUTO: 7 K/UL (ref 4.3–11.1)

## 2019-09-08 PROCEDURE — 85025 COMPLETE CBC W/AUTO DIFF WBC: CPT

## 2019-09-08 PROCEDURE — 81003 URINALYSIS AUTO W/O SCOPE: CPT | Performed by: EMERGENCY MEDICINE

## 2019-09-08 PROCEDURE — 82803 BLOOD GASES ANY COMBINATION: CPT

## 2019-09-08 PROCEDURE — 36600 WITHDRAWAL OF ARTERIAL BLOOD: CPT

## 2019-09-08 PROCEDURE — 80053 COMPREHEN METABOLIC PANEL: CPT

## 2019-09-08 PROCEDURE — 99284 EMERGENCY DEPT VISIT MOD MDM: CPT | Performed by: EMERGENCY MEDICINE

## 2019-09-09 ENCOUNTER — APPOINTMENT (OUTPATIENT)
Dept: CT IMAGING | Age: 79
End: 2019-09-09
Attending: EMERGENCY MEDICINE
Payer: MEDICARE

## 2019-09-09 ENCOUNTER — HOSPITAL ENCOUNTER (EMERGENCY)
Age: 79
Discharge: HOME | End: 2019-09-09
Attending: EMERGENCY MEDICINE
Payer: MEDICARE

## 2019-09-09 VITALS
HEIGHT: 64 IN | WEIGHT: 232 LBS | BODY MASS INDEX: 39.61 KG/M2 | HEART RATE: 67 BPM | DIASTOLIC BLOOD PRESSURE: 72 MMHG | TEMPERATURE: 98.4 F | OXYGEN SATURATION: 95 % | RESPIRATION RATE: 18 BRPM | SYSTOLIC BLOOD PRESSURE: 142 MMHG

## 2019-09-09 DIAGNOSIS — R40.4 TRANSIENT ALTERATION OF AWARENESS: Primary | ICD-10-CM

## 2019-09-09 PROCEDURE — 70450 CT HEAD/BRAIN W/O DYE: CPT

## 2019-09-09 NOTE — DISCHARGE INSTRUCTIONS
Follow-up with your primary care doctor and to discuss neurology follow-up  Continue all your current medications  Do not drive or operate machinery until you are cleared by your doctors  Return to ER for any worsening symptoms or new problems which may arise

## 2019-09-09 NOTE — ED NOTES
I have reviewed discharge instructions with the patient. The patient verbalized understanding. Patient left ED via Discharge Method: ambulatory to Home with family. Opportunity for questions and clarification provided. Patient given 0 scripts. Pt in no acute distress at time of d/c        To continue your aftercare when you leave the hospital, you may receive an automated call from our care team to check in on how you are doing. This is a free service and part of our promise to provide the best care and service to meet your aftercare needs.  If you have questions, or wish to unsubscribe from this service please call [phone]. Thank you for Choosing our [facility] Emergency Department.

## 2019-09-09 NOTE — ED PROVIDER NOTES
23-year-old female presents with complaints of an episode of transient unresponsiveness  Reports that she was sitting with a \"more elderly\" person. She was sitting on a sofa watching movies. The next thing she remembers is being awakened by EMS checking her blood pressure  She has had several similar episodes in the past but never had the symptoms addressed by her primary care provider  She reports that she wakes up with a very brief episode of confusion and the inability to speak. The symptoms clear in a few seconds and then she feels back to normal.  She reports that she did not take any of her evening medicines prior to the onset of the symptoms. She does take medication for sleep but again, she did not take the medicines this evening. Denies any recent illnesses    The history is provided by the patient and a relative. Fatigue   This is a recurrent problem. The current episode started 3 to 5 hours ago. The problem has been resolved. There was no focality noted. Primary symptoms include unresponsiveness. There has been no fever. Pertinent negatives include no shortness of breath, no chest pain, no vomiting, no headaches and no bladder incontinence.  There were no medications administered prior to arrival.        Past Medical History:   Diagnosis Date    Abnormal CXR 8/23/2016    Abnormal glucose 8/23/2016    Anemia, deficiency 8/23/2016    Antibiotic prophylaxis for dental procedure indicated due to prior joint replacement 8/23/2016    Anxiety 8/23/2016    Arthritis     Back pain 8/23/2016    Bradycardia 8/23/2016    Carotid atherosclerosis 8/23/2016    Chest discomfort     CHF (congestive heart failure) ([facility] [address] 8/23/2016    Chronic kidney disease     mild    Chronic pain     Congestive heart failure, unspecified     CRI (chronic renal insufficiency) 8/23/2016    Dyspnea     Elevated BNP  8/23/2016    Elevated ferritin 8/23/2016    Encounter for long-term (current) use of medications 8/23/2016    Encounter for long-term use of antiplatelets/antithrombotics 8/23/2016    Encounter for monitoring of chronic aspirin therapy     Fatigue     GERD (gastroesophageal reflux disease)     no more    Hyperlipidemia 8/23/2016    Hypertension     Hypertension, benign 8/23/2016    Insomnia     Leg swelling 8/23/2016    Lumbar radiculopathy, acute 8/23/2016    Malaise and fatigue 8/23/2016    Muscle cramps 8/23/2016    Nausea 8/23/2016    Neck pain, chronic 8/23/2016    Obesity 8/23/2016    Occlusion and stenosis of carotid artery 9/16/2014    L>R    Otitis externa, chronic 8/23/2016    Pain in right hip 8/23/2016    Pain management contract signed 08/01/2012    Positive D dimer     Postmenopausal related mood disorder 8/23/2016    Proteinuria 8/23/2016    Rhinitis 8/23/2016    Skin lesion     Weight gain 8/23/2016       Past Surgical History:   Procedure Laterality Date    HX BREAST BIOPSY Right 1961    prior benign right breast biopsy    HX BREAST BIOPSY Right 2017    rt stereo bx b9    HX CARPAL TUNNEL RELEASE Right 2017    Dr. Rhonda Middleton    HX CATARACT REMOVAL Bilateral 10/2008    Dr. Rolo Fisher at [facility]      surgery to open fallopian tubes    HX HIP REPLACEMENT Left 07/03/2007    [employer] Implant-she has a card; had blood transfusion    HX LAP CHOLECYSTECTOMY      HX ORTHOPAEDIC Right 2003    hand    HX OVARIAN CYST REMOVAL  1958         Family History:   Problem Relation Age of Onset    Heart Disease Mother         CHF    Hypertension Mother     Stroke Mother         age 79    Cancer Father     Hypertension Sister     Hypertension Brother     Breast Cancer Neg Hx        Social History     Socioeconomic History    Marital status:      Spouse name: Not on file    Number of children: Not on file    Years of education: Not on file    Highest education level: Not on file   Occupational History    Not on file   Social Needs    Financial resource strain: Not on file    Food insecurity:     Worry: Not on file     Inability: Not on file    Transportation needs:     Medical: Not on file     Non-medical: Not on file   Tobacco Use    Smoking status: Former Smoker     Last attempt to quit: 2010     Years since quittin.9    Smokeless tobacco: Never Used    Tobacco comment: 25 pack year history   Substance and Sexual Activity    Alcohol use: No    Drug use: No    Sexual activity: Not on file   Lifestyle    Physical activity:     Days per week: Not on file     Minutes per session: Not on file    Stress: Not on file   Relationships    Social connections:     Talks on phone: Not on file     Gets together: Not on file     Attends Congregational service: Not on file     Active member of club or organization: Not on file     Attends meetings of clubs or organizations: Not on file     Relationship status: Not on file    Intimate partner violence:     Fear of current or ex partner: Not on file     Emotionally abused: Not on file     Physically abused: Not on file     Forced sexual activity: Not on file   Other Topics Concern    Not on file   Social History Narrative    Not on file         ALLERGIES: Latex; Altace [ramipril]; Ambien [zolpidem]; Codeine phosphate; Lescol xl [fluvastatin]; Lipitor [atorvastatin]; Micardis [telmisartan]; Oxybutynin chloride; Praluent pen [alirocumab]; Vytorin 10-10 [ezetimibe-simvastatin]; Zetia [ezetimibe]; and Zocor [simvastatin]    Review of Systems   Constitutional: Positive for fatigue. Negative for chills and fever. HENT: Negative for congestion, ear pain and rhinorrhea. Eyes: Negative for photophobia and discharge. Respiratory: Negative for cough and shortness of breath. Cardiovascular: Negative for chest pain and palpitations. Gastrointestinal: Negative for abdominal pain, constipation, diarrhea and vomiting. Endocrine: Negative for cold intolerance and heat intolerance.    Genitourinary: Negative for bladder incontinence, dysuria and flank pain. Musculoskeletal: Positive for arthralgias, back pain and myalgias. Negative for neck pain. Skin: Negative for rash and wound. Allergic/Immunologic: Negative for environmental allergies and food allergies. Neurological: Negative for syncope and headaches. Hematological: Negative for adenopathy. Does not bruise/bleed easily. Psychiatric/Behavioral: Negative for dysphoric mood. The patient is not nervous/anxious. All other systems reviewed and are negative. Vitals:    09/08/19 2144 09/09/19 0123 09/09/19 0123   BP: 153/70 145/69    Pulse: 65 67    Resp: 18     Temp: 98.4 °F (36.9 °C)     SpO2: 95% 95% 95%   Weight: 105.2 kg (232 lb)     Height: 5' 4\" (1.626 m)              Physical Exam   Constitutional: She is oriented to person, place, and time. She appears well-developed and well-nourished. She appears distressed. HENT:   Head: Normocephalic and atraumatic. Mouth/Throat: Oropharynx is clear and moist. No oropharyngeal exudate. Eyes: Pupils are equal, round, and reactive to light. Conjunctivae and EOM are normal.   Neck: Normal range of motion. Neck supple. No JVD present. Cardiovascular: Normal rate, regular rhythm, normal heart sounds and intact distal pulses. Exam reveals no gallop and no friction rub. No murmur heard. Pulmonary/Chest: Effort normal and breath sounds normal.   Abdominal: Soft. Normal appearance and bowel sounds are normal. She exhibits no distension and no mass. There is no hepatosplenomegaly. There is no tenderness. Musculoskeletal: Normal range of motion. She exhibits no deformity. Neurological: She is alert and oriented to person, place, and time. She has normal strength. No cranial nerve deficit or sensory deficit. She displays a negative Romberg sign. Gait normal.   Skin: Skin is warm and dry. Capillary refill takes less than 2 seconds. No rash noted. Psychiatric: She has a normal mood and affect.  Her speech is normal and behavior is normal. Judgment and thought content normal. Cognition and memory are normal.   Nursing note and vitals reviewed. MDM  Number of Diagnoses or Management Options  Transient alteration of awareness: new and requires workup  Diagnosis management comments: 75-year-old female with episodes of transient unresponsiveness  Differential diagnosis includes deep sleep, medication effect, seizure  No focal findings that would represent a stroke syndrome  Patient currently asymptomatic       Amount and/or Complexity of Data Reviewed  Clinical lab tests: ordered and reviewed  Tests in the radiology section of CPT®: ordered and reviewed  Obtain history from someone other than the patient: yes  Review and summarize past medical records: yes    Risk of Complications, Morbidity, and/or Mortality  Presenting problems: high  Diagnostic procedures: moderate  Management options: moderate  General comments: Elements of this note have been dictated via voice recognition software. Text and phrases may be limited by the accuracy of the software. The chart has been reviewed, but errors may still be present.       Patient Progress  Patient progress: resolved         Procedures

## 2019-09-09 NOTE — ED TRIAGE NOTES
Pt coming from home. Lady that rents the room to her states she was hard to arouse. States this has happened 3 times in the past. Has not seen anyone in the past. EMS arrived and woke her up voice and she has been alert and oriented since. Denies chest px or SHOB. EMS reports no complaints.  Denies cardiac or respiratory hx

## 2019-12-06 ENCOUNTER — HOSPITAL ENCOUNTER (OUTPATIENT)
Dept: MRI IMAGING | Age: 79
Discharge: HOME | End: 2019-12-06
Attending: PSYCHIATRY & NEUROLOGY
Payer: MEDICARE

## 2019-12-06 DIAGNOSIS — R41.82 ALTERED MENTAL STATUS, UNSPECIFIED ALTERED MENTAL STATUS TYPE: ICD-10-CM

## 2019-12-06 DIAGNOSIS — R56.9 SEIZURE (HCC): ICD-10-CM

## 2019-12-06 PROCEDURE — 70551 MRI BRAIN STEM W/O DYE: CPT

## 2019-12-26 ENCOUNTER — HOSPITAL ENCOUNTER (OUTPATIENT)
Dept: SLEEP MEDICINE | Age: 79
Discharge: HOME | End: 2019-12-26
Payer: MEDICARE

## 2019-12-26 PROCEDURE — 95810 POLYSOM 6/> YRS 4/> PARAM: CPT

## 2020-01-14 PROBLEM — E66.01 SEVERE OBESITY (HCC): Status: ACTIVE | Noted: 2020-01-14

## 2020-05-27 ENCOUNTER — HOSPITAL ENCOUNTER (OUTPATIENT)
Dept: SLEEP MEDICINE | Age: 80
Discharge: HOME | End: 2020-05-27
Payer: MEDICARE

## 2020-05-27 PROCEDURE — 95806 SLEEP STUDY UNATT&RESP EFFT: CPT

## 2020-07-22 ENCOUNTER — HOSPITAL ENCOUNTER (OUTPATIENT)
Dept: SLEEP MEDICINE | Age: 80
Discharge: HOME | End: 2020-07-22
Payer: MEDICARE

## 2020-07-22 PROCEDURE — 95811 POLYSOM 6/>YRS CPAP 4/> PARM: CPT

## 2020-09-02 ENCOUNTER — HOSPITAL ENCOUNTER (OUTPATIENT)
Dept: MAMMOGRAPHY | Age: 80
Discharge: HOME | End: 2020-09-02
Attending: FAMILY MEDICINE

## 2020-09-02 DIAGNOSIS — Z12.31 VISIT FOR SCREENING MAMMOGRAM: ICD-10-CM

## 2021-01-06 PROBLEM — G47.34 NOCTURNAL HYPOXEMIA: Status: ACTIVE | Noted: 2021-01-06

## 2021-01-06 PROBLEM — Z99.89 OSA ON CPAP: Status: ACTIVE | Noted: 2021-01-06

## 2021-01-06 PROBLEM — G47.33 OSA ON CPAP: Status: ACTIVE | Noted: 2021-01-06

## 2021-10-13 ENCOUNTER — TRANSCRIBE ORDER (OUTPATIENT)
Dept: SCHEDULING | Age: 81
End: 2021-10-13

## 2021-10-13 DIAGNOSIS — Z12.31 VISIT FOR SCREENING MAMMOGRAM: Primary | ICD-10-CM

## 2021-10-19 ENCOUNTER — HOSPITAL ENCOUNTER (OUTPATIENT)
Dept: MAMMOGRAPHY | Age: 81
Discharge: HOME | End: 2021-10-19
Attending: FAMILY MEDICINE

## 2021-10-19 DIAGNOSIS — Z12.31 VISIT FOR SCREENING MAMMOGRAM: ICD-10-CM

## 2022-02-22 PROBLEM — Z78.9 STATIN INTOLERANCE: Status: ACTIVE | Noted: 2022-02-22

## 2022-02-22 PROBLEM — I49.1 PAC (PREMATURE ATRIAL CONTRACTION): Status: ACTIVE | Noted: 2022-02-22

## 2022-03-18 PROBLEM — G47.34 NOCTURNAL HYPOXEMIA: Status: ACTIVE | Noted: 2021-01-06

## 2022-03-19 PROBLEM — Z78.9 STATIN INTOLERANCE: Status: ACTIVE | Noted: 2022-02-22

## 2022-03-19 PROBLEM — G47.33 OSA ON CPAP: Status: ACTIVE | Noted: 2021-01-06

## 2022-03-19 PROBLEM — Z99.89 OSA ON CPAP: Status: ACTIVE | Noted: 2021-01-06

## 2022-03-19 PROBLEM — E66.01 SEVERE OBESITY (HCC): Status: ACTIVE | Noted: 2020-01-14

## 2022-03-19 PROBLEM — I49.1 PAC (PREMATURE ATRIAL CONTRACTION): Status: ACTIVE | Noted: 2022-02-22

## 2022-11-03 ENCOUNTER — OFFICE VISIT (OUTPATIENT)
Dept: NEUROLOGY | Age: 82
End: 2022-11-03
Payer: COMMERCIAL

## 2022-11-03 VITALS
HEART RATE: 68 BPM | DIASTOLIC BLOOD PRESSURE: 53 MMHG | BODY MASS INDEX: 40.93 KG/M2 | SYSTOLIC BLOOD PRESSURE: 111 MMHG | HEIGHT: 63 IN | WEIGHT: 231 LBS

## 2022-11-03 DIAGNOSIS — G47.33 OSA (OBSTRUCTIVE SLEEP APNEA): Primary | ICD-10-CM

## 2022-11-03 PROCEDURE — 3074F SYST BP LT 130 MM HG: CPT | Performed by: PSYCHIATRY & NEUROLOGY

## 2022-11-03 PROCEDURE — 1123F ACP DISCUSS/DSCN MKR DOCD: CPT | Performed by: PSYCHIATRY & NEUROLOGY

## 2022-11-03 PROCEDURE — 3078F DIAST BP <80 MM HG: CPT | Performed by: PSYCHIATRY & NEUROLOGY

## 2022-11-03 PROCEDURE — 99214 OFFICE O/P EST MOD 30 MIN: CPT | Performed by: PSYCHIATRY & NEUROLOGY

## 2022-11-03 ASSESSMENT — ENCOUNTER SYMPTOMS
EYES NEGATIVE: 1
RESPIRATORY NEGATIVE: 1
GASTROINTESTINAL NEGATIVE: 1
ALLERGIC/IMMUNOLOGIC NEGATIVE: 1

## 2022-11-03 NOTE — PROGRESS NOTES
[address], [address], [address]  Phone: [phone] Fax [phone]  Dr. Denise Jurado      11/3/2022  Carmenza Landa     Patient is referred by the following provider for consultation regarding as below:       I reviewed the available records and notes and have examined patient with the following findings:     Chief Complaint:  Chief Complaint   Patient presents with    Memory Loss          HPI: This is a right handed 78 y.o. Single female who is very pleasant very appropriate patient who continuously has recurrent episodes that been going on for a few years where about 4/year she would literally just fall asleep. Now to family members into outside it looks like she was having episodes of unresponsiveness and she was unarousable. They were aggressively stimulator and they could not get her awake her to arouse. And then when she finally would arouse she be absolutely fine and normal with no postictal state almost as if she came right out of it. Her MRI of her brain was normal but her sleep study showed an apnea-hypopnea index of 8.6 and 459 minutes with an O2 saturation less than 89% once we got her on CPAP and I believe she does need O2 with it. She had done remarkably well and has not had any further episodes of unresponsiveness/sleep attacks. She is great she is comfortable she is doing well she wants to try a full facemask which we can get her an order for. IMAGING REVIEW:  I REVIEWED PERTINENT  IMAGES AND REPORTS WITH THE PATIENT PERSONALLY, DIRECTLY AND FULLY.      Past Medical History:  Past Medical History:   Diagnosis Date    Abnormal CXR 8/23/2016    Abnormal glucose 8/23/2016    Anemia, deficiency 8/23/2016    Antibiotic prophylaxis for dental procedure indicated due to prior joint replacement 8/23/2016    Anxiety 8/23/2016    Arthritis     Back pain 8/23/2016    Bradycardia 8/23/2016    Carotid atherosclerosis 8/23/2016    Chest discomfort     CHF (congestive heart failure) ([facility] [address] 8/23/2016    Chronic kidney disease     mild    Chronic pain     Congestive heart failure, unspecified     CRI (chronic renal insufficiency) 8/23/2016    Dyspnea     Elevated brain natriuretic peptide (BNP) level 8/23/2016    Elevated ferritin 8/23/2016    Encounter for long-term (current) use of medications 8/23/2016    Encounter for long-term use of antiplatelets/antithrombotics 8/23/2016    Encounter for monitoring of chronic aspirin therapy     Fatigue     GERD (gastroesophageal reflux disease)     no more    Hyperlipidemia 8/23/2016    Hypertension     Hypertension, benign 8/23/2016    Insomnia     Leg swelling 8/23/2016    Lumbar radiculopathy, acute 8/23/2016    Malaise and fatigue 8/23/2016    Muscle cramps 8/23/2016    Nausea 8/23/2016    Neck pain, chronic 8/23/2016    Obesity 8/23/2016    Occlusion and stenosis of carotid artery 9/16/2014    L>R    Otitis externa, chronic 8/23/2016    Pain in right hip 8/23/2016    Pain management contract signed 08/01/2012    Positive D dimer     Postmenopausal related mood disorder 8/23/2016    Proteinuria 8/23/2016    Rhinitis 8/23/2016    Skin lesion     Weight gain 8/23/2016       Past Surgical History:  Past Surgical History:   Procedure Laterality Date    BREAST BIOPSY Right 1961    prior benign right breast biopsy    BREAST BIOPSY Right 2017    rt stereo bx b9    CARPAL TUNNEL RELEASE Right 2017    Dr. Fide Luna Bilateral 10/2008    Dr. Ronnie Carias at [facility], [address]      surgery to open fallopian tubes    EMILEE STEROTACTIC LOC BREAST BIOPSY RIGHT Right 5/30/2017    EMILEE STEROTACTIC LOC BREAST BIOPSY RIGHT 5/30/2017 [facility]    ORTHOPEDIC SURGERY Right 2003    hand    OVARIAN CYST REMOVAL  1958    TOTAL HIP ARTHROPLASTY Left 07/03/2007    [employer]-Bi[employer] Implant-she has a card; had blood transfusion       Social History:  Social History     Socioeconomic History Marital status:      Spouse name: Not on file    Number of children: Not on file    Years of education: Not on file    Highest education level: Not on file   Occupational History    Not on file   Tobacco Use    Smoking status: Former     Types: Cigarettes     Quit date: 2010     Years since quittin.1    Smokeless tobacco: Never    Tobacco comments:     Quit smokin pack year history   Substance and Sexual Activity    Alcohol use: No    Drug use: No    Sexual activity: Not on file   Other Topics Concern    Not on file   Social History Narrative    Not on file     Social Determinants of Health     Financial Resource Strain: Not on file   Food Insecurity: Not on file   Transportation Needs: Not on file   Physical Activity: Not on file   Stress: Not on file   Social Connections: Not on file   Intimate Partner Violence: Not on file   Housing Stability: Not on file       Family History:   Family History   Problem Relation Age of Onset    Heart Disease Mother         CHF    Hypertension Mother     Stroke Mother         age 66    Cancer Father     Hypertension Sister     Hypertension Brother     Breast Cancer Neg Hx        Current Outpatient Medications on File Prior to Visit   Medication Sig Dispense Refill    L-LYSINE PO Take 1,000 mg by mouth      ALPRAZolam (XANAX) 2 MG tablet Take 2-4 mg by mouth. amLODIPine (NORVASC) 5 MG tablet Take 5 mg by mouth daily      bisoprolol (ZEBETA) 5 MG tablet Take 5 mg by mouth daily      calcium carbonate (OYSTER SHELL CALCIUM 500 MG) 1250 (500 Ca) MG tablet Take by mouth daily      vitamin D (CHOLECALCIFEROL) 25 MCG (1000 UT) TABS tablet Take 1,000 Units by mouth daily      furosemide (LASIX) 40 MG tablet Take 40 mg by mouth daily      HYDROcodone-acetaminophen (NORCO)  MG per tablet Take 2 tablets by mouth every 6 hours as needed.       magnesium oxide (MAG-OX) 400 MG tablet Take 400 mg by mouth every other day      metoprolol tartrate (LOPRESSOR) 25 MG tablet Take 25 mg by mouth 2 times daily      naloxone 4 MG/0.1ML LIQD nasal spray Use 1 spray intranasally into 1 nostril. Use a new Narcan nasal spray for subsequent doses and administer into alternating nostrils. May repeat every 2 to 3 minutes as needed. nitroGLYCERIN (NITROSTAT) 0.4 MG SL tablet Place 0.4 mg under the tongue      nystatin (MYCOSTATIN) 536979 UNIT/GM powder Apply topically 4 times daily      ondansetron (ZOFRAN) 4 MG tablet Take 4 mg by mouth 3 times daily as needed      pitavastatin (LIVALO) 2 MG TABS tablet Take 2 mg by mouth daily       No current facility-administered medications on file prior to visit. Allergies   Allergen Reactions    Latex Rash    Alirocumab Other (See Comments)     Runny nose, irritation around the nose    Atorvastatin Other (See Comments)     \"Can't take even low doses\"    Codeine Other (See Comments)     Hives, can't breathe    Ezetimibe Diarrhea    Ezetimibe-Simvastatin Other (See Comments)     Upper abd stomach cramps    Fluvastatin Other (See Comments)     \"also caused troubles\"    Oxybutynin Chloride Other (See Comments)     \"Dried mouth for 1 week\"    Ramipril Other (See Comments)    Simvastatin Other (See Comments)     Abd cramps    Telmisartan Other (See Comments)    Zolpidem Other (See Comments)     Bad dreams       Review of Systems:  Review of Systems   Constitutional: Negative. HENT: Negative. Eyes: Negative. Respiratory: Negative. Cardiovascular: Negative. Gastrointestinal: Negative. Endocrine: Negative. Genitourinary: Negative. Musculoskeletal: Negative. Skin: Negative. Allergic/Immunologic: Negative. Neurological:         Sleep attacks    No flowsheet data found. No flowsheet data found. Vitals:    11/03/22 1035   BP: (!) 111/53   Pulse: 68   Weight: 231 lb (104.8 kg)   Height: 5' 3\" (1.6 m)        Physical Exam  Constitutional:       Appearance: Normal appearance.    HENT:      Head: Normocephalic and atraumatic. Eyes:      Extraocular Movements: Extraocular movements intact and EOM normal.      Pupils: Pupils are equal, round, and reactive to light. Cardiovascular:      Rate and Rhythm: Normal rate and regular rhythm. Pulses: Normal pulses. Pulmonary:      Effort: Pulmonary effort is normal.   Abdominal:      General: Abdomen is flat. Neurological:      Mental Status: She is alert and oriented to person, place, and time. Deep Tendon Reflexes:      Reflex Scores:       Tricep reflexes are 1+ on the right side and 1+ on the left side. Bicep reflexes are 1+ on the right side and 1+ on the left side. Brachioradialis reflexes are 1+ on the right side and 1+ on the left side. Patellar reflexes are 1+ on the right side and 1+ on the left side. Achilles reflexes are 0 on the right side and 0 on the left side. Neurologic Exam     Mental Status   Oriented to person, place, and time. Attention: normal. Concentration: normal.   Level of consciousness: alert  Knowledge: good. Cranial Nerves     CN II   Visual fields full to confrontation. CN III, IV, VI   Pupils are equal, round, and reactive to light. Extraocular motions are normal.     CN VII   Facial expression full, symmetric. Motor Exam   Right arm tone: normal  Left arm tone: normal  Right leg tone: normal  Left leg tone: normal    Gait, Coordination, and Reflexes     Gait  Gait: wide-based    Reflexes   Right brachioradialis: 1+  Left brachioradialis: 1+  Right biceps: 1+  Left biceps: 1+  Right triceps: 1+  Left triceps: 1+  Right patellar: 1+  Left patellar: 1+  Right achilles: 0  Left achilles: 0She does walk with a cane due to her back problems. Assessment   Assessment / Plan:    Rock Zaldivar was seen today for memory loss.     Diagnoses and all orders for this visit:    ELIZABETH (obstructive sleep apnea) the patient's recurrent episodes of unresponsiveness does appear to be associated with her sleep apnea and even more so her drop in oxygen throughout the night. She had 460 minutes with an O2 saturation less than 89% on her sleep study. Since being on CPAP she is done dramatically well she has not had any further though sleep attack episodes. She is doing great she is living with her sister and has no complaints at this time. She would like to try a full facemask to see if it will be more comfortable and I did give her an order for 1. The Diagnosis and differential diagnostic considerations, and Rx Tx were reviewed with the patient at length. No orders of the defined types were placed in this encounter. I have spent greater than 50% of visit discussing and counseling of patient  for treatment and diagnostic plan review. Total time 30 min     . Notes: Patient is to continue all medications as directed by prescribing physicians. Continuations on today's visit are made based on the patient's report of current medications.              Dr. Bijan Howard  Consultation Neurology, Neurodiagnostics and Neurotherapeutics  Neuroelectrophysiology, EEG, EMG  [facility]  [address] [place] [address], [facility]  Phone:  [phone]  Fax:   [phone]

## 2022-11-17 ENCOUNTER — HOSPITAL ENCOUNTER (OUTPATIENT)
Dept: MAMMOGRAPHY | Age: 82
Discharge: HOME | End: 2022-11-20
Payer: MEDICARE

## 2022-11-17 DIAGNOSIS — Z12.31 SCREENING MAMMOGRAM FOR BREAST CANCER: ICD-10-CM

## 2022-11-17 PROCEDURE — 77067 SCR MAMMO BI INCL CAD: CPT

## 2022-12-06 ENCOUNTER — OFFICE VISIT (OUTPATIENT)
Dept: ORTHOPEDIC SURGERY | Age: 82
End: 2022-12-06
Payer: MEDICARE

## 2022-12-06 VITALS — BODY MASS INDEX: 41.82 KG/M2 | HEIGHT: 63 IN | WEIGHT: 236 LBS

## 2022-12-06 DIAGNOSIS — M20.5X2 ACQUIRED CLAW TOE OF LEFT FOOT: ICD-10-CM

## 2022-12-06 DIAGNOSIS — M19.072 PRIMARY OSTEOARTHRITIS OF LEFT FOOT: ICD-10-CM

## 2022-12-06 DIAGNOSIS — M20.22 HALLUX RIGIDUS OF LEFT FOOT: ICD-10-CM

## 2022-12-06 DIAGNOSIS — M79.672 LEFT FOOT PAIN: Primary | ICD-10-CM

## 2022-12-06 DIAGNOSIS — M21.619 BUNION: ICD-10-CM

## 2022-12-06 PROCEDURE — 99204 OFFICE O/P NEW MOD 45 MIN: CPT | Performed by: ORTHOPAEDIC SURGERY

## 2022-12-06 PROCEDURE — G8484 FLU IMMUNIZE NO ADMIN: HCPCS | Performed by: ORTHOPAEDIC SURGERY

## 2022-12-06 PROCEDURE — G8400 PT W/DXA NO RESULTS DOC: HCPCS | Performed by: ORTHOPAEDIC SURGERY

## 2022-12-06 PROCEDURE — 1090F PRES/ABSN URINE INCON ASSESS: CPT | Performed by: ORTHOPAEDIC SURGERY

## 2022-12-06 PROCEDURE — G8427 DOCREV CUR MEDS BY ELIG CLIN: HCPCS | Performed by: ORTHOPAEDIC SURGERY

## 2022-12-06 PROCEDURE — G8417 CALC BMI ABV UP PARAM F/U: HCPCS | Performed by: ORTHOPAEDIC SURGERY

## 2022-12-06 PROCEDURE — 1123F ACP DISCUSS/DSCN MKR DOCD: CPT | Performed by: ORTHOPAEDIC SURGERY

## 2022-12-06 PROCEDURE — 1036F TOBACCO NON-USER: CPT | Performed by: ORTHOPAEDIC SURGERY

## 2022-12-06 NOTE — PROGRESS NOTES
Name: Praful Schultz  YOB: 1940  Gender: female  MRN: [id number]     CC: Left foot pain    HPI:   She reports years of issues with both feet, but over the last 3-4 months, more issues on the left. She reports the left great toe bunion became infected that resolved with antibiotics  12/06/2022: She presents to assess her left forefoot. 1-2 crossover deformity prevents shoe wear and causes pain    ROS/Meds/PSH/PMH/FH/SH: reviewed today    Tobacco:  reports that she quit smoking about 12 years ago. Her smoking use included cigarettes. She has never used smokeless tobacco.     Physical Examination:  Patient appears to be alert and oriented with acceptable appearance. No obvious distress or SOB  CV: appears to have acceptable vascular color and capillary refill  Neuro: appears to have mostly intact light touch sensation   Skin: No evidence for ulceration, infection; onychomycosis; left 2/1 toe pressure  MS: Standing: Bilateral bunions, 2-3 claw toes: Left 2/1 deformity: Gait with a cane  Left = 2-3 claw toe deformity with 2/1 crossover deformity; bunion and second claw toe irreducible  Right = 2-3 tibial claw toes but no crossover    XR: Left side: Standing AP lateral oblique foot taken today with significant accessory navicular, navicular tuberosity, talonavicular arthritis; tarsometatarsal arthritis; arthritic bunion; 1-2 crossover deformity claw toes  XR Impression:  As above      Assessment:    Left bunion, 2-3 tibial claw toes, 2/1 crossover deformity  Right bunion, 2-3 tibial claw toes    Plan:   The patient and I discussed the above assessment. We explored treatment options.      We discussed her deformities and that surgery is the only way to fix her problem  She tried toe sleeves that did not help  She has reasonable shoes   I see no evidence of osteomyelitis or infected bunion today  We discussed surgery, my thumb debilities and need to see partner   We discussed risk and complications of surgery and outlined surgical procedure involving:  Left first MTP fusion: 2-3 claw toe resections  She understands the postoperative course and time of no weightbearing  She is ready to proceed with surgery. Medication - OTC meds prn:  We discussed extra-depth shoes  Follow up: Surgical partner  Work status: Nonapplicable  History and discussion of management with: Her sister    This note was created using Dragon voice recognition software which may result in errors of speech and spelling recognition and word/phrase syntax errors.

## 2022-12-29 ENCOUNTER — OFFICE VISIT (OUTPATIENT)
Dept: ORTHOPEDIC SURGERY | Age: 82
End: 2022-12-29
Payer: MEDICARE

## 2022-12-29 DIAGNOSIS — M19.072 PRIMARY OSTEOARTHRITIS OF LEFT FOOT: Primary | ICD-10-CM

## 2022-12-29 PROCEDURE — 99214 OFFICE O/P EST MOD 30 MIN: CPT | Performed by: ORTHOPAEDIC SURGERY

## 2022-12-29 PROCEDURE — G8484 FLU IMMUNIZE NO ADMIN: HCPCS | Performed by: ORTHOPAEDIC SURGERY

## 2022-12-29 PROCEDURE — G8417 CALC BMI ABV UP PARAM F/U: HCPCS | Performed by: ORTHOPAEDIC SURGERY

## 2022-12-29 PROCEDURE — G8428 CUR MEDS NOT DOCUMENT: HCPCS | Performed by: ORTHOPAEDIC SURGERY

## 2022-12-29 PROCEDURE — 1123F ACP DISCUSS/DSCN MKR DOCD: CPT | Performed by: ORTHOPAEDIC SURGERY

## 2022-12-29 PROCEDURE — 1090F PRES/ABSN URINE INCON ASSESS: CPT | Performed by: ORTHOPAEDIC SURGERY

## 2022-12-29 PROCEDURE — 1036F TOBACCO NON-USER: CPT | Performed by: ORTHOPAEDIC SURGERY

## 2022-12-29 PROCEDURE — G8400 PT W/DXA NO RESULTS DOC: HCPCS | Performed by: ORTHOPAEDIC SURGERY

## 2022-12-29 NOTE — PROGRESS NOTES
Name: Mendoza Day  YOB: 1940  Gender: female  MRN: [id number]    Summary: Left hallux rigidus and 2nd hammertoe: Proceed with left first MTP fusion and left second hammertoe correction. Cardiac clearance needed    Popliteal saphenous block-potential need to admit afterwards     CC: Left great toe pain and stiffness    HPI: Mendoza Day is a 78 y.o. female who presents with pain and stiffness over the first MTP joint and great toe. They state is aggravated by walking, bending down, are attempting to go up and down on the great toe. Shoes that press down on the top of this big toe also create discomfort and force them to change their shoe wear. They do describe  shooting numbness and tingling going into their great toe. There is a bump on the top of the great toe joint that is sensitive to touch, turns red and often gets irritated. This has resulted in difficulty wearing shoes, and really irritates. Any activities such as running, jumping and steps are very difficult, in fact they do avoid them if at all possible. This effects them every day, is getting worse, and limits their life. The second toe is also crossing over the great toe causing a more pain    She has been treated nonoperatively for a while. She continues with daughter today who presents stating that of patient has a hard time balancing has had increasing redness and erythema to the  Worse. She has been recommended have surgery in the past but denied it. Finally she saw Dr. Derek Wong last month who recommended she have surgery to prevent recurrent cellulitis in her toes. ROS/Meds/PSH/PMH/FH/SH: I personally reviewed the patients standard intake form. Below are the pertinents    Tobacco:  reports that she quit smoking about 12 years ago. Her smoking use included cigarettes.  She has never used smokeless tobacco.  Past Medical History:   Diagnosis Date    Abnormal CXR 8/23/2016    Abnormal glucose 8/23/2016    Anemia, deficiency 8/23/2016    Antibiotic prophylaxis for dental procedure indicated due to prior joint replacement 8/23/2016    Anxiety 8/23/2016    Arthritis     Back pain 8/23/2016    Bradycardia 8/23/2016    Carotid atherosclerosis 8/23/2016    Chest discomfort     CHF (congestive heart failure) ([facility] [address] 8/23/2016    Chronic kidney disease     mild    Chronic pain     Congestive heart failure, unspecified     CRI (chronic renal insufficiency) 8/23/2016    Dyspnea     Elevated brain natriuretic peptide (BNP) level 8/23/2016    Elevated ferritin 8/23/2016    Encounter for long-term (current) use of medications 8/23/2016    Encounter for long-term use of antiplatelets/antithrombotics 8/23/2016    Encounter for monitoring of chronic aspirin therapy     Fatigue     GERD (gastroesophageal reflux disease)     no more    Hyperlipidemia 8/23/2016    Hypertension     Hypertension, benign 8/23/2016    Insomnia     Leg swelling 8/23/2016    Lumbar radiculopathy, acute 8/23/2016    Malaise and fatigue 8/23/2016    Muscle cramps 8/23/2016    Nausea 8/23/2016    Neck pain, chronic 8/23/2016    Obesity 8/23/2016    Occlusion and stenosis of carotid artery 9/16/2014    L>R    Otitis externa, chronic 8/23/2016    Pain in right hip 8/23/2016    Pain management contract signed 08/01/2012    Positive D dimer     Postmenopausal related mood disorder 8/23/2016    Proteinuria 8/23/2016    Rhinitis 8/23/2016    Skin lesion     Weight gain 8/23/2016         Physical Examination:  LeftLower Extremity: 2+ DP. Toes x 5 WWP w BCR. +SILT ssspdpt. ROM of hip, knee, ankle and foot with no difficulties and similar to other side. No instability of the ankle with drawer and stress. 55 strength to [id number]  No skin lesions, ulcers, edema, or ecchymosis except at the 1st MTP. There is some edema around the 1st MTP. 1st MTP exam reveals pain at mid range of motion.   There is obvious hallux valgus noted with a rigid deformity that is not passively correctable. Second hammertoe is also present. It is a crossover toe deformity. It is red and there is a small blister over the dorsal surface of the second toe PIP joint. This is a rigid PIP joint with a hammertoe. It is not passively correctable. normal silverskoid exam: With the hindfoot in neutral and forefoot supinated there is good ankle dorsiflexion with the knee flexed and extended. Neutral hindfoot alignment. No cavovarus nor planovalgus foot deformity  Talar tilt exam : normal  Anterior drawer exam w/ ankle plantarflexed at 20 deg: normal    Neuro:  normal SILT to s/s/sp/dp/t. Reflexes normal: 1+ patella reflex bilaterally, 1+ achilles reflex bilaterally, negative babinski bilaterally. no signs of hyper reflexia or absent reflex    Vascular: BLE: 2+ DP pulse, toes wwp w/ BCR<2s    Imaging:   December 6, 2022 x-rays reviewed  X-Ray LEFT Foot 3 vw (AP/Lateral/Oblique) for foot pain   Findings: Significant hallux valgus with some degenerative changes and large medial eminence noted. Second hammertoe with crossover deformity noted. Impression: Hallux valgus with rigid and second hammertoe   Signature: Onel Acosta MD       Assessment:   Right Hallux Rigidus  Right second hammertoe    Plan:   4 This is a chronic illness/condition with exacerbation and progression  Treatment at this time: Elective major surgery with procedural risk factors    Weight-bearing status: WBAT        Studies ordered: NO XR needed @ Next Visit  Return to work/work restrictions: none  No medications given    We discussed continuing nonoperative treatment with silicone toe spacers, diabetic extra-depth shoes, and Budin splints. She is already tried these and is not willing to continue the nonoperative route. I think the wisest thing to do with her significant rigid first MTP joint is MTP fusion along with a second hammertoe correction.   I explained this to her in great detail. I discussed surgery with this patient and the risk associated with surgery. These risk include infection, delayed wound healing, hardware failure, painful hardware, recurring wounds, recurring pain, damage to surrounding structures such as nerves, vessels, tendons, ligaments, and joints. I discussed how no surgery is perfect and this surgery may not be successful. There is also risk of anesthesia such as nerve damage from local anesthetic, damage to the airway or mouth structures, respiratory distress, cardiac disease exacerbation, potential arrhythmias, and even death. The patient verbalized understanding of each of these risk and elected to proceed with surgery.           Past Medical History:   Diagnosis Date    Abnormal CXR 8/23/2016    Abnormal glucose 8/23/2016    Anemia, deficiency 8/23/2016    Antibiotic prophylaxis for dental procedure indicated due to prior joint replacement 8/23/2016    Anxiety 8/23/2016    Arthritis     Back pain 8/23/2016    Bradycardia 8/23/2016    Carotid atherosclerosis 8/23/2016    Chest discomfort     CHF (congestive heart failure) ([facility] [address] 8/23/2016    Chronic kidney disease     mild    Chronic pain     Congestive heart failure, unspecified     CRI (chronic renal insufficiency) 8/23/2016    Dyspnea     Elevated brain natriuretic peptide (BNP) level 8/23/2016    Elevated ferritin 8/23/2016    Encounter for long-term (current) use of medications 8/23/2016    Encounter for long-term use of antiplatelets/antithrombotics 8/23/2016    Encounter for monitoring of chronic aspirin therapy     Fatigue     GERD (gastroesophageal reflux disease)     no more    Hyperlipidemia 8/23/2016    Hypertension     Hypertension, benign 8/23/2016    Insomnia     Leg swelling 8/23/2016    Lumbar radiculopathy, acute 8/23/2016    Malaise and fatigue 8/23/2016    Muscle cramps 8/23/2016    Nausea 8/23/2016    Neck pain, chronic 8/23/2016    Obesity 8/23/2016    Occlusion and stenosis of carotid artery 9/16/2014    L>R    Otitis externa, chronic 8/23/2016    Pain in right hip 8/23/2016    Pain management contract signed 08/01/2012    Positive D dimer     Postmenopausal related mood disorder 8/23/2016    Proteinuria 8/23/2016    Rhinitis 8/23/2016    Skin lesion     Weight gain 8/23/2016           Current Outpatient Medications:     L-LYSINE PO, Take 1,000 mg by mouth, Disp: , Rfl:     ALPRAZolam (XANAX) 2 MG tablet, Take 2-4 mg by mouth., Disp: , Rfl:     amLODIPine (NORVASC) 5 MG tablet, Take 5 mg by mouth daily, Disp: , Rfl:     bisoprolol (ZEBETA) 5 MG tablet, Take 5 mg by mouth daily, Disp: , Rfl:     calcium carbonate (OYSTER SHELL CALCIUM 500 MG) 1250 (500 Ca) MG tablet, Take by mouth daily, Disp: , Rfl:     vitamin D (CHOLECALCIFEROL) 25 MCG (1000 UT) TABS tablet, Take 1,000 Units by mouth daily, Disp: , Rfl:     furosemide (LASIX) 40 MG tablet, Take 40 mg by mouth daily, Disp: , Rfl:     HYDROcodone-acetaminophen (NORCO)  MG per tablet, Take 2 tablets by mouth every 6 hours as needed. , Disp: , Rfl:     magnesium oxide (MAG-OX) 400 MG tablet, Take 400 mg by mouth every other day, Disp: , Rfl:     metoprolol tartrate (LOPRESSOR) 25 MG tablet, Take 25 mg by mouth 2 times daily, Disp: , Rfl:     naloxone 4 MG/0.1ML LIQD nasal spray, Use 1 spray intranasally into 1 nostril. Use a new Narcan nasal spray for subsequent doses and administer into alternating nostrils. May repeat every 2 to 3 minutes as needed. , Disp: , Rfl:     nitroGLYCERIN (NITROSTAT) 0.4 MG SL tablet, Place 0.4 mg under the tongue, Disp: , Rfl:     nystatin (MYCOSTATIN) 983800 UNIT/GM powder, Apply topically 4 times daily, Disp: , Rfl:     ondansetron (ZOFRAN) 4 MG tablet, Take 4 mg by mouth 3 times daily as needed, Disp: , Rfl:     pitavastatin (LIVALO) 2 MG TABS tablet, Take 2 mg by mouth daily, Disp: , Rfl:

## 2023-01-11 PROBLEM — M79.672 PAIN OF LEFT MIDFOOT: Status: ACTIVE | Noted: 2023-01-11

## 2023-01-11 PROBLEM — M19.079 ARTHRITIS OF MIDFOOT: Status: ACTIVE | Noted: 2023-01-11

## 2023-01-24 ENCOUNTER — TELEPHONE (OUTPATIENT)
Dept: ORTHOPEDIC SURGERY | Age: 83
End: 2023-01-24

## 2023-03-17 ENCOUNTER — HOSPITAL ENCOUNTER (EMERGENCY)
Age: 83
Discharge: HOME | End: 2023-03-17
Attending: EMERGENCY MEDICINE
Payer: MEDICARE

## 2023-03-17 ENCOUNTER — APPOINTMENT (OUTPATIENT)
Dept: CT IMAGING | Age: 83
End: 2023-03-17
Payer: MEDICARE

## 2023-03-17 VITALS
RESPIRATION RATE: 17 BRPM | TEMPERATURE: 98.4 F | BODY MASS INDEX: 38.98 KG/M2 | HEART RATE: 65 BPM | SYSTOLIC BLOOD PRESSURE: 132 MMHG | WEIGHT: 220 LBS | OXYGEN SATURATION: 93 % | HEIGHT: 63 IN | DIASTOLIC BLOOD PRESSURE: 101 MMHG

## 2023-03-17 DIAGNOSIS — K57.32 DIVERTICULITIS OF COLON: Primary | ICD-10-CM

## 2023-03-17 DIAGNOSIS — N28.1 RENAL CYST, LEFT: ICD-10-CM

## 2023-03-17 DIAGNOSIS — D72.829 LEUKOCYTOSIS, UNSPECIFIED TYPE: ICD-10-CM

## 2023-03-17 DIAGNOSIS — E27.8 ADRENAL NODULE (HCC): ICD-10-CM

## 2023-03-17 DIAGNOSIS — R10.31 ABDOMINAL PAIN, RIGHT LOWER QUADRANT: ICD-10-CM

## 2023-03-17 DIAGNOSIS — N17.9 AKI (ACUTE KIDNEY INJURY) (HCC): ICD-10-CM

## 2023-03-17 LAB
ALBUMIN SERPL-MCNC: 3.5 G/DL (ref 3.2–4.6)
ALBUMIN/GLOB SERPL: 1.1 (ref 0.4–1.6)
ALP SERPL-CCNC: 55 U/L (ref 50–136)
ALT SERPL-CCNC: 26 U/L (ref 12–65)
ANION GAP SERPL CALC-SCNC: 2 MMOL/L (ref 2–11)
APPEARANCE UR: CLEAR
AST SERPL-CCNC: 20 U/L (ref 15–37)
BASOPHILS # BLD: 0 K/UL (ref 0–0.2)
BASOPHILS NFR BLD: 0 % (ref 0–2)
BILIRUB SERPL-MCNC: 0.7 MG/DL (ref 0.2–1.1)
BILIRUB UR QL: NEGATIVE
BUN SERPL-MCNC: 37 MG/DL (ref 8–23)
CALCIUM SERPL-MCNC: 8.4 MG/DL (ref 8.3–10.4)
CHLORIDE SERPL-SCNC: 102 MMOL/L (ref 101–110)
CO2 SERPL-SCNC: 27 MMOL/L (ref 21–32)
COLOR UR: NORMAL
CREAT SERPL-MCNC: 2.2 MG/DL (ref 0.6–1)
DIFFERENTIAL METHOD BLD: ABNORMAL
EKG ATRIAL RATE: 66 BPM
EKG DIAGNOSIS: NORMAL
EKG P AXIS: 57 DEGREES
EKG P-R INTERVAL: 186 MS
EKG Q-T INTERVAL: 400 MS
EKG QRS DURATION: 82 MS
EKG QTC CALCULATION (BAZETT): 419 MS
EKG R AXIS: 32 DEGREES
EKG T AXIS: 64 DEGREES
EKG VENTRICULAR RATE: 66 BPM
EOSINOPHIL # BLD: 0 K/UL (ref 0–0.8)
EOSINOPHIL NFR BLD: 0 % (ref 0.5–7.8)
ERYTHROCYTE [DISTWIDTH] IN BLOOD BY AUTOMATED COUNT: 12.7 % (ref 11.9–14.6)
GLOBULIN SER CALC-MCNC: 3.2 G/DL (ref 2.8–4.5)
GLUCOSE SERPL-MCNC: 123 MG/DL (ref 65–100)
GLUCOSE UR STRIP.AUTO-MCNC: 500 MG/DL
HCT VFR BLD AUTO: 39.3 % (ref 35.8–46.3)
HGB BLD-MCNC: 13 G/DL (ref 11.7–15.4)
HGB UR QL STRIP: NEGATIVE
IMM GRANULOCYTES # BLD AUTO: 0.1 K/UL (ref 0–0.5)
IMM GRANULOCYTES NFR BLD AUTO: 1 % (ref 0–5)
KETONES UR QL STRIP.AUTO: NEGATIVE MG/DL
LACTATE SERPL-SCNC: 1.9 MMOL/L (ref 0.4–2)
LEUKOCYTE ESTERASE UR QL STRIP.AUTO: NEGATIVE
LIPASE SERPL-CCNC: 42 U/L (ref 73–393)
LYMPHOCYTES # BLD: 2.7 K/UL (ref 0.5–4.6)
LYMPHOCYTES NFR BLD: 16 % (ref 13–44)
MCH RBC QN AUTO: 33 PG (ref 26.1–32.9)
MCHC RBC AUTO-ENTMCNC: 33.1 G/DL (ref 31.4–35)
MCV RBC AUTO: 99.7 FL (ref 82–102)
MONOCYTES # BLD: 1 K/UL (ref 0.1–1.3)
MONOCYTES NFR BLD: 6 % (ref 4–12)
NEUTS SEG # BLD: 13.4 K/UL (ref 1.7–8.2)
NEUTS SEG NFR BLD: 77 % (ref 43–78)
NITRITE UR QL STRIP.AUTO: NEGATIVE
NRBC # BLD: 0 K/UL (ref 0–0.2)
PH UR STRIP: 5.5 (ref 5–9)
PLATELET # BLD AUTO: 163 K/UL (ref 150–450)
PMV BLD AUTO: 10.6 FL (ref 9.4–12.3)
POTASSIUM SERPL-SCNC: 4.5 MMOL/L (ref 3.5–5.1)
PROT SERPL-MCNC: 6.7 G/DL (ref 6.3–8.2)
PROT UR STRIP-MCNC: NEGATIVE MG/DL
RBC # BLD AUTO: 3.94 M/UL (ref 4.05–5.2)
SODIUM SERPL-SCNC: 131 MMOL/L (ref 133–143)
SP GR UR REFRACTOMETRY: 1.01 (ref 1–1.02)
UROBILINOGEN UR QL STRIP.AUTO: 0.2 EU/DL (ref 0.2–1)
WBC # BLD AUTO: 17.4 K/UL (ref 4.3–11.1)

## 2023-03-17 PROCEDURE — 87040 BLOOD CULTURE FOR BACTERIA: CPT

## 2023-03-17 PROCEDURE — 80053 COMPREHEN METABOLIC PANEL: CPT

## 2023-03-17 PROCEDURE — 96365 THER/PROPH/DIAG IV INF INIT: CPT

## 2023-03-17 PROCEDURE — 6360000002 HC RX W HCPCS: Performed by: EMERGENCY MEDICINE

## 2023-03-17 PROCEDURE — 2580000003 HC RX 258: Performed by: EMERGENCY MEDICINE

## 2023-03-17 PROCEDURE — 83690 ASSAY OF LIPASE: CPT

## 2023-03-17 PROCEDURE — 83605 ASSAY OF LACTIC ACID: CPT

## 2023-03-17 PROCEDURE — 81003 URINALYSIS AUTO W/O SCOPE: CPT

## 2023-03-17 PROCEDURE — 74176 CT ABD & PELVIS W/O CONTRAST: CPT

## 2023-03-17 PROCEDURE — 93005 ELECTROCARDIOGRAM TRACING: CPT | Performed by: EMERGENCY MEDICINE

## 2023-03-17 PROCEDURE — 85025 COMPLETE CBC W/AUTO DIFF WBC: CPT

## 2023-03-17 PROCEDURE — 99284 EMERGENCY DEPT VISIT MOD MDM: CPT

## 2023-03-17 RX ORDER — MORPHINE SULFATE 4 MG/ML
4 INJECTION, SOLUTION INTRAMUSCULAR; INTRAVENOUS
Status: DISCONTINUED | OUTPATIENT
Start: 2023-03-17 | End: 2023-03-17

## 2023-03-17 RX ORDER — HYDROCODONE BITARTRATE AND ACETAMINOPHEN 5; 325 MG/1; MG/1
1 TABLET ORAL
Status: DISCONTINUED | OUTPATIENT
Start: 2023-03-17 | End: 2023-03-17 | Stop reason: HOSPADM

## 2023-03-17 RX ORDER — 0.9 % SODIUM CHLORIDE 0.9 %
1000 INTRAVENOUS SOLUTION INTRAVENOUS ONCE
Status: COMPLETED | OUTPATIENT
Start: 2023-03-17 | End: 2023-03-17

## 2023-03-17 RX ORDER — METRONIDAZOLE 500 MG/1
500 TABLET ORAL 3 TIMES DAILY
Qty: 30 TABLET | Refills: 0 | Status: SHIPPED | OUTPATIENT
Start: 2023-03-17 | End: 2023-03-27

## 2023-03-17 RX ORDER — CEFPODOXIME PROXETIL 200 MG/1
200 TABLET, FILM COATED ORAL 2 TIMES DAILY
Qty: 20 TABLET | Refills: 0 | Status: SHIPPED | OUTPATIENT
Start: 2023-03-17 | End: 2023-03-27

## 2023-03-17 RX ORDER — ONDANSETRON 2 MG/ML
4 INJECTION INTRAMUSCULAR; INTRAVENOUS
Status: DISCONTINUED | OUTPATIENT
Start: 2023-03-17 | End: 2023-03-17 | Stop reason: HOSPADM

## 2023-03-17 RX ADMIN — SODIUM CHLORIDE 1000 ML: 9 INJECTION, SOLUTION INTRAVENOUS at 15:27

## 2023-03-17 RX ADMIN — CEFTRIAXONE 1000 MG: 1 INJECTION, POWDER, FOR SOLUTION INTRAMUSCULAR; INTRAVENOUS at 15:27

## 2023-03-17 ASSESSMENT — PAIN DESCRIPTION - ORIENTATION: ORIENTATION: RIGHT;LOWER

## 2023-03-17 ASSESSMENT — ENCOUNTER SYMPTOMS
ABDOMINAL PAIN: 1
DIARRHEA: 0
NAUSEA: 0
SHORTNESS OF BREATH: 0
BLOOD IN STOOL: 0
VOMITING: 0
COUGH: 0
CONSTIPATION: 0
RHINORRHEA: 0

## 2023-03-17 ASSESSMENT — PAIN DESCRIPTION - LOCATION: LOCATION: ABDOMEN

## 2023-03-17 ASSESSMENT — PAIN SCALES - GENERAL: PAINLEVEL_OUTOF10: 10

## 2023-03-17 NOTE — ED TRIAGE NOTES
Pt reports having right side lower abdominal since 2100 last night that has been continuous. LBM last night and reports as normal. Denies n/v/d, urinary symptoms, or noting blood in stool or urine.

## 2023-03-17 NOTE — ED PROVIDER NOTES
Emergency Department Provider Note                   PCP:                Frandy Mason MD               Age: 78 y.o. Sex: female     DISPOSITION Decision To Discharge 03/17/2023 04:24:59 PM       ICD-10-CM    1. Diverticulitis of colon  K57.32 Estella Rahman MD, Gastroenterology, [facility]      2. VERONICA (acute kidney injury) ([facility] [address]  N17.9 AFL - Marisa Valadez MD, Nephrology, [facility]      3. Leukocytosis, unspecified type  D72.829       4. Abdominal pain, right lower quadrant  R10.31       5. Adrenal nodule (HCC)  E27.8       6. Renal cyst, left  N28.1           MEDICAL DECISION MAKING  Complexity of Problems Addressed:  1 or more acute illnesses that pose a threat to life or bodily function. Complexity of Problem: 1 or more chronic illnesses with severe exacerbation. (5)        Data Reviewed and Analyzed:  Category 1:   I reviewed records from an external source: ED records from outside this hospital.  I reviewed records from an external source: provider visit notes from PCP. I reviewed records from an external source: provider visit notes from outside specialist.  I reviewed records from an external source: previous lab results from outside ED. I reviewed records from an external source: previous imaging studies from outside ED. I ordered each unique test.  I interpreted the results of each unique test.        Assessment and Plan     Chronic pain disorder (Primary)  Assessment & Plan:  Up-to-date controlled drug contract today October 20 of 21, drug test also today. She took her alprazolam last night, and her pain med about 8 AM this morning. Pain meds provide 40-50% relief (pain level 8 down to a pain level of 4), and she does not have any major side effects. She is now treated for sleep apnea. She is aware of the risks of mixing alprazolam with hydrocodone, and she has Narcan available and her family member Regis Horne is aware of how to use it. She says it is in date.     Orders:  - Drug Monitoring, Panel 3, with Confirmation, Urine    Insomnia due to medical condition  - alprazolam (XANAX) 2 MG tablet    Chronic right-sided low back pain with right-sided sciatica  - HYDROcodone-acetaminophen (NORCO )  mg per tablet  - HYDROcodone-acetaminophen (NORCO )  mg per tablet  - HYDROcodone-acetaminophen (NORCO )  mg per tablet    Essential hypertension  - Magnesium (Mg)  - Comprehensive Metabolic Panel (CMP)    Type 2 diabetes mellitus with stage 3a chronic kidney disease, without long-term current use of insulin (HCC)  - Magnesium (Mg)  - Comprehensive Metabolic Panel (CMP)  - Glycosylated Hemoglobin (Hgb A1C)- POC  - Microalbumin/Creatinine Ratio, Urine  - Urinalysis, Auto without Microscopic-POC    Pure hypercholesterolemia  - Lipid Profile  - Comprehensive Metabolic Panel (CMP)    Stage 3a chronic kidney disease (HCC)  Assessment & Plan:  Continue lab monitoring, continue current dose of furosemide, I encouraged measurement of weight and blood pressure. Category 2:   I independently ordered and interpreted the EKG. I independently ordered and interpreted the CT Scan. CT abdomen pelvis with evidence of bowel wall thickening noted to his cecum, descending colon. Left adrenal nodule. Left renal cyst noted. In agreement with radiologist interpretation. Category 3: Discussion of management or test interpretation. 68-year-old female with history of ELIZABETH on CPAP, hypertension, CAD, CKD presents with complaint of worsening right lower quadrant pain since last night around 2100. States the pain has been sharp, continuous since onset. States her last bowel movement was last night and states that it was normal in caliber. Denies N/V/D, fever, chills, CP, SOB.  VSS. Afebrile.   Differential diagnosis includes but is not limited to appendicitis, diverticulitis, small bowel obstruction, large bowel obstruction, constipation, UTI, pyelonephritis, nephrolithiasis, bowel perforation, VERONICA, sepsis, etc.  ==============================================  IV established. Patient given 1 L normal saline IV fluid bolus. Patient declines morphine due to allergy. Order placed for Norco.  Patient states she does not tolerate. Basic labs were obtained and patient was noted to have leukocytosis with white blood cell count of 17.4. Creatinine noted to be slightly elevated from baseline at 2.2. Most recent creatinine for comparison 1.93. Given leukocytosis, VERONICA on CKD, blood cultures and lactic acid was obtained. Concern for possible UTI. Rocephin 1 g IV ordered. Glucose stable at 123. EKG with normal sinus rhythm. Heart rate 66. PACs noted. No ST elevation present. CT abdomen and pelvis renal stone protocol obtained given patient with VERONICA and unable to administer IV contrast.  CT abdomen and pelvis with fibrofatty changes to liver. Left adrenal nodule. Simple left renal cyst.  Additionally mild wall thickening along the cecum and ascending colon. Lactic acid noted to be within normal limits. Ultimately UA was negative for any evidence of UTI. Given CT abdomen pelvis was done without IV contrast and patient symptoms of right lower quadrant pain and evidence of wall thickening along the cecum and descending colon there is concern for colitis. Discussed admission with patient for IV antibiotics and additional IV fluid hydration and pain control. Patient declines at this time. States that she would like to be discharged home and will return if symptoms worsen in any way. Discussed treatment options. Will discharge home with Vantin, Flagyl. Patient instructed on need for close follow-up with gastroenterology, primary care physician. Referral placed for patient to follow-up with Dr. Angelika Finney. Instructed patient on all CT findings and need for close outpatient follow-up as well as further work-up for left adrenal nodule.   Instructed patient that she would need further work-up for her abdominal pain to rule out possibility of malignancy. Patient given strict return precautions. Referral placed for follow-up with GI. Patient in agreement with plan. ED Course as of 03/19/23 2117   Fri Mar 17, 2023   1408 WBC(!): 17.4 [DF]   1421 Creatinine(!): 2.20 [DF]   1421 BUN,BUNPL(!): 37 [DF]   1421 Glucose, Random(!): 123 [DF]   1451 CT abdomen pelvis renal stone protocol    IMPRESSION:  1. Fibrofatty appearing changes in the liver. 2. Left adrenal nodule showing benign characteristics but if imaging  confirmation is desired adrenal protocol CT or MR should be considered. 3. Nonobstructing tiny calcification in right kidney. 4. Exophytic simple cyst left kidney. 5. Some wall thickening along the cecum and descending colon without signs of  active diverticulitis. Further workup to exclude malignancy in a patient this age is recommended [DF]      ED Course User Index  [DF] Dangelo Vargas MD       Risk of Complications and/or Morbidity of Patient Management:  OTC drug management performed, Prescription drug management performed, Patient was discharged risks and benefits of hospitalization were considered, and Shared medical decision making was utilized in creating the patients health plan today. Felix South is a 78 y.o. female who presents to the Emergency Department with chief complaint of right lower quadrant abdominal pain. Chief Complaint   Patient presents with    Abdominal Pain      61-year-old female with history of ELIZABETH on CPAP, hypertension, CAD, CKD presents with complaint of worsening right lower quadrant pain since last night around 2100. States the pain has been sharp, continuous since onset. States her last bowel movement was last night and states that it was normal in caliber. Denies nausea, vomiting, diarrhea, dysuria, hematuria, hematochezia, melena. Denies fever, chills, chest pain, shortness of breath.   Denies any alleviating or exacerbating factors. Reports history of laparoscopic cholecystectomy. The history is provided by the patient. No  was used. Review of Systems   Constitutional:  Negative for chills, diaphoresis, fatigue and fever. HENT:  Negative for congestion and rhinorrhea. Respiratory:  Negative for cough and shortness of breath. Cardiovascular:  Negative for chest pain. Gastrointestinal:  Positive for abdominal pain. Negative for blood in stool, constipation, diarrhea, nausea and vomiting. Genitourinary:  Negative for dysuria, flank pain, urgency, vaginal discharge and vaginal pain. Musculoskeletal:  Negative for arthralgias and myalgias. Skin:  Negative for rash. Neurological:  Negative for dizziness, weakness, light-headedness and headaches. Hematological:  Does not bruise/bleed easily. Vitals signs and nursing note reviewed. No data found. Physical Exam  Vitals and nursing note reviewed. Constitutional:       Appearance: She is well-developed. HENT:      Head: Normocephalic. Mouth/Throat:      Mouth: Mucous membranes are moist.   Eyes:      Extraocular Movements: Extraocular movements intact. Pupils: Pupils are equal, round, and reactive to light. Cardiovascular:      Rate and Rhythm: Normal rate. Heart sounds: Normal heart sounds. Pulmonary:      Effort: Pulmonary effort is normal.      Breath sounds: Normal breath sounds. Abdominal:      General: Abdomen is flat. Bowel sounds are normal.      Palpations: Abdomen is soft. Tenderness: There is abdominal tenderness in the right lower quadrant. There is no right CVA tenderness or left CVA tenderness. Negative signs include Covarrubias's sign, Rovsing's sign, McBurney's sign and psoas sign. Comments: Mild right lower quadrant tenderness palpation. No rebound or guarding. No peritoneal signs. No CVA tenderness. No pulsatile mass noted. Skin:     General: Skin is warm.       Findings: No erythema or rash. Neurological:      General: No focal deficit present. Mental Status: She is alert and oriented to person, place, and time. Motor: No weakness.         EKG 12 Lead    Date/Time: 3/17/2023 3:30 PM  Performed by: Milena Silva MD  Authorized by: Milena Silva MD     ECG reviewed by ED Physician in the absence of a cardiologist: yes    Rate:     ECG rate:  66    ECG rate assessment: normal    Rhythm:     Rhythm: sinus rhythm    Ectopy:     Ectopy: PAC    QRS:     QRS axis:  Normal    QRS intervals:  Normal    QRS conduction: normal    ST segments:     ST segments:  Normal  T waves:     T waves: normal       Orders Placed This Encounter   Procedures    Blood Culture 1    CT ABDOMEN PELVIS RENAL STONE    CMP    CBC with Auto Differential    Lipase    Urinalysis w rflx microscopic    Lactic Acid    BS - Aimee Mccartney MD, Gastroenterology, Scarlett Fitzgerald MD, Nephrology, [place]    EKG 12 Lead    Saline lock IV        Medications   0.9 % sodium chloride bolus (0 mLs IntraVENous Stopped 3/17/23 1646)   cefTRIAXone (ROCEPHIN) 1,000 mg in sodium chloride 0.9 % 50 mL IVPB (mini-bag) (0 mg IntraVENous Stopped 3/17/23 1606)       Discharge Medication List as of 3/17/2023  4:34 PM        START taking these medications    Details   cefpodoxime (VANTIN) 200 MG tablet Take 1 tablet by mouth 2 times daily for 10 days, Disp-20 tablet, R-0Print      metroNIDAZOLE (FLAGYL) 500 MG tablet Take 1 tablet by mouth 3 times daily for 10 days, Disp-30 tablet, R-0Print              Past Medical History:   Diagnosis Date    Abnormal CXR 8/23/2016    Abnormal glucose 8/23/2016    Anemia, deficiency 8/23/2016    Antibiotic prophylaxis for dental procedure indicated due to prior joint replacement 8/23/2016    Anxiety 8/23/2016    Arthritis     Back pain 8/23/2016    Bradycardia 8/23/2016    Carotid atherosclerosis 8/23/2016    Chest discomfort     CHF (congestive heart failure) ([facility] [address] 8/23/2016    Chronic kidney disease     mild    Chronic pain     Congestive heart failure, unspecified     CRI (chronic renal insufficiency) 8/23/2016    Dyspnea     Elevated brain natriuretic peptide (BNP) level 8/23/2016    Elevated ferritin 8/23/2016    Encounter for long-term (current) use of medications 8/23/2016    Encounter for long-term use of antiplatelets/antithrombotics 8/23/2016    Encounter for monitoring of chronic aspirin therapy     Fatigue     GERD (gastroesophageal reflux disease)     no more    Hyperlipidemia 8/23/2016    Hypertension     Hypertension, benign 8/23/2016    Insomnia     Leg swelling 8/23/2016    Lumbar radiculopathy, acute 8/23/2016    Malaise and fatigue 8/23/2016    Muscle cramps 8/23/2016    Nausea 8/23/2016    Neck pain, chronic 8/23/2016    Obesity 8/23/2016    Occlusion and stenosis of carotid artery 9/16/2014    L>R    Otitis externa, chronic 8/23/2016    Pain in right hip 8/23/2016    Pain management contract signed 08/01/2012    Positive D dimer     Postmenopausal related mood disorder 8/23/2016    Proteinuria 8/23/2016    Rhinitis 8/23/2016    Skin lesion     Weight gain 8/23/2016        Past Surgical History:   Procedure Laterality Date    BREAST BIOPSY Right 1961    prior benign right breast biopsy    BREAST BIOPSY Right 2017    rt stereo bx b9    CARPAL TUNNEL RELEASE Right 2017    Dr. Jake Gavin Bilateral 10/2008    Dr. Momo Vides at [address], [address]      surgery to open fallopian tubes    EMILEE STEROTACTIC LOC BREAST BIOPSY RIGHT Right 5/30/2017    EMILEE STEROTACTIC LOC BREAST BIOPSY RIGHT 5/30/2017 [facility]    ORTHOPEDIC SURGERY Right 2003    hand    OVARIAN CYST REMOVAL  1958    TOTAL HIP ARTHROPLASTY Left 07/03/2007    Total-Biomet Implant-she has a card; had blood transfusion        Family History   Problem Relation Age of Onset    Heart Disease Mother         CHF    Hypertension Mother     Stroke Mother age 66    Cancer Father     Hypertension Sister     Hypertension Brother     Breast Cancer Neg Hx         Social History     Socioeconomic History    Marital status:      Spouse name: None    Number of children: None    Years of education: None    Highest education level: None   Tobacco Use    Smoking status: Former     Types: Cigarettes     Quit date: 2010     Years since quittin.5    Smokeless tobacco: Never    Tobacco comments:     Quit smokin pack year history   Substance and Sexual Activity    Alcohol use: No    Drug use: No        Allergies: Latex, Alirocumab, Atorvastatin, Codeine, Ezetimibe, Ezetimibe-simvastatin, Fluvastatin, Morphine, Oxybutynin chloride, Ramipril, Simvastatin, Telmisartan, and Zolpidem    Discharge Medication List as of 3/17/2023  4:34 PM        CONTINUE these medications which have NOT CHANGED    Details   empagliflozin (JARDIANCE) 10 MG tablet Jardiance 10 mg tablet   TAKE 1 TABLET BY MOUTH EVERY DAY FOR 30 DAYSHistorical Med      L-LYSINE PO Take 1,000 mg by mouthHistorical Med      ALPRAZolam (XANAX) 2 MG tablet Take 2-4 mg by mouth. Historical Med      amLODIPine (NORVASC) 5 MG tablet Take 5 mg by mouth dailyHistorical Med      bisoprolol (ZEBETA) 5 MG tablet Take 5 mg by mouth dailyHistorical Med      calcium carbonate (OYSTER SHELL CALCIUM 500 MG) 1250 (500 Ca) MG tablet Take by mouth dailyHistorical Med      vitamin D (CHOLECALCIFEROL) 25 MCG (1000 UT) TABS tablet Take 1,000 Units by mouth dailyHistorical Med      furosemide (LASIX) 40 MG tablet Take 40 mg by mouth dailyHistorical Med      HYDROcodone-acetaminophen (NORCO)  MG per tablet Take 2 tablets by mouth every 6 hours as needed. Historical Med      magnesium oxide (MAG-OX) 400 MG tablet Take 400 mg by mouth every other dayHistorical Med      metoprolol tartrate (LOPRESSOR) 25 MG tablet Take 25 mg by mouth 2 times dailyHistorical Med      naloxone 4 MG/0.1ML LIQD nasal spray Use 1 spray intranasally into 1 nostril. Use a new Narcan nasal spray for subsequent doses and administer into alternating nostrils. May repeat every 2 to 3 minutes as needed. Historical Med      nitroGLYCERIN (NITROSTAT) 0.4 MG SL tablet Place 0.4 mg under the tongueHistorical Med      nystatin (MYCOSTATIN) 831923 UNIT/GM powder Apply topically 4 times daily, Topical, 4 TIMES DAILY Starting Fri 12/9/2016, Historical Med      ondansetron (ZOFRAN) 4 MG tablet Take 4 mg by mouth 3 times daily as neededHistorical Med      pitavastatin (LIVALO) 2 MG TABS tablet Take 2 mg by mouth dailyHistorical Med              Results for orders placed or performed during the hospital encounter of 03/17/23   Blood Culture 1    Specimen: Blood   Result Value Ref Range    Special Requests RIGHT  Antecubital        Culture NO GROWTH 2 DAYS     CT ABDOMEN PELVIS RENAL STONE    Narrative    CT abdomen/renal and pelvis done without oral nor IV contrast 17 February, 2023    Reference exam: None    Indication: Right-sided pain since last night    Technique: Radiation dose reduction techniques were used for this study using  one or more of the following: automated exposure control; adjustment of mA  and/or kV (according to patient size);  iterative reconstruction. CT abdomen/renal: Thin axial images were taken through the abdomen. Visualized  lung bases appear clear. Visualized portions of the spleen, pancreas, right  adrenal, appear normal. There is no intraperitoneal free air, ascites, nor  abnormal adenopathy seen. Visualized portions of the liver show decreased  density suggesting fibrofatty change, the gallbladder is absent. Left adrenal  shows a 1.5 cm 10 Hounsfield unit nodule. Small splenule is seen adjacent to the  spleen. Right kidney shows tiny 1 mm calcification with no signs of obstruction.   Left kidney shows a lower pole exophytic 8 mm 5 Hounsfield unit simple appearing  cyst. There are diverticula without evidence of active diverticulitis. There  appears to be some wall thickening along the cecum and proximal ascending colon. CT pelvis: Thin axial images were taken through the pelvis. There is no  intraperitoneal free air, ascites, nor abnormal adenopathy seen. There is no  signs of diverticulitis noted. The appendix appears very short but present. There are multiple diverticula along the distal colon, the uterus is present,  there is metallic artifact from left hip prosthesis, the bladder appears normal.      Impression    1. Fibrofatty appearing changes in the liver. 2. Left adrenal nodule showing benign characteristics but if imaging  confirmation is desired adrenal protocol CT or MR should be considered. 3. Nonobstructing tiny calcification in right kidney. 4. Exophytic simple cyst left kidney. 5. Some wall thickening along the cecum and descending colon without signs of  active diverticulitis. Further workup to exclude malignancy in a patient this  age is recommended.      CMP   Result Value Ref Range    Sodium 131 (L) 133 - 143 mmol/L    Potassium 4.5 3.5 - 5.1 mmol/L    Chloride 102 101 - 110 mmol/L    CO2 27 21 - 32 mmol/L    Anion Gap 2 2 - 11 mmol/L    Glucose 123 (H) 65 - 100 mg/dL    BUN 37 (H) 8 - 23 MG/DL    Creatinine 2.20 (H) 0.6 - 1.0 MG/DL    Est, Glom Filt Rate 22 (L) >60 ml/min/1.73m2    Calcium 8.4 8.3 - 10.4 MG/DL    Total Bilirubin 0.7 0.2 - 1.1 MG/DL    ALT 26 12 - 65 U/L    AST 20 15 - 37 U/L    Alk Phosphatase 55 50 - 136 U/L    Total Protein 6.7 6.3 - 8.2 g/dL    Albumin 3.5 3.2 - 4.6 g/dL    Globulin 3.2 2.8 - 4.5 g/dL    Albumin/Globulin Ratio 1.1 0.4 - 1.6     CBC with Auto Differential   Result Value Ref Range    WBC 17.4 (H) 4.3 - 11.1 K/uL    RBC 3.94 (L) 4.05 - 5.2 M/uL    Hemoglobin 13.0 11.7 - 15.4 g/dL    Hematocrit 39.3 35.8 - 46.3 %    MCV 99.7 82 - 102 FL    MCH 33.0 (H) 26.1 - 32.9 PG    MCHC 33.1 31.4 - 35.0 g/dL    RDW 12.7 11.9 - 14.6 %    Platelets 856 558 - 428 K/uL    MPV 10.6 9.4 - 12.3 FL    nRBC 0.00 0.0 - 0.2 K/uL    Differential Type AUTOMATED      Seg Neutrophils 77 43 - 78 %    Lymphocytes 16 13 - 44 %    Monocytes 6 4.0 - 12.0 %    Eosinophils % 0 (L) 0.5 - 7.8 %    Basophils 0 0.0 - 2.0 %    Immature Granulocytes 1 0.0 - 5.0 %    Segs Absolute 13.4 (H) 1.7 - 8.2 K/UL    Absolute Lymph # 2.7 0.5 - 4.6 K/UL    Absolute Mono # 1.0 0.1 - 1.3 K/UL    Absolute Eos # 0.0 0.0 - 0.8 K/UL    Basophils Absolute 0.0 0.0 - 0.2 K/UL    Absolute Immature Granulocyte 0.1 0.0 - 0.5 K/UL   Lipase   Result Value Ref Range    Lipase 42 (L) 73 - 393 U/L   Urinalysis w rflx microscopic   Result Value Ref Range    Color, UA YELLOW/STRAW      Appearance CLEAR      Specific [place], UA 1.011 1.001 - 1.023      pH, Urine 5.5 5.0 - 9.0      Protein, UA Negative NEG mg/dL    Glucose,  mg/dL    Ketones, Urine Negative NEG mg/dL    Bilirubin Urine Negative NEG      Blood, Urine Negative NEG      Urobilinogen, Urine 0.2 0.2 - 1.0 EU/dL    Nitrite, Urine Negative NEG      Leukocyte Esterase, Urine Negative NEG     Lactic Acid   Result Value Ref Range    Lactic Acid, Plasma 1.9 0.4 - 2.0 MMOL/L   EKG 12 Lead   Result Value Ref Range    Ventricular Rate 66 BPM    Atrial Rate 66 BPM    P-R Interval 186 ms    QRS Duration 82 ms    Q-T Interval 400 ms    QTc Calculation (Bazett) 419 ms    P Axis 57 degrees    R Axis 32 degrees    T Axis 64 degrees    Diagnosis Sinus rhythm with Premature atrial complexes         CT ABDOMEN PELVIS RENAL STONE   Final Result   1. Fibrofatty appearing changes in the liver. 2. Left adrenal nodule showing benign characteristics but if imaging   confirmation is desired adrenal protocol CT or MR should be considered. 3. Nonobstructing tiny calcification in right kidney. 4. Exophytic simple cyst left kidney. 5. Some wall thickening along the cecum and descending colon without signs of   active diverticulitis.  Further workup to exclude malignancy in a patient this   age is recommended. Voice dictation software was used during the making of this note. This software is not perfect and grammatical and other typographical errors may be present. This note has not been completely proofread for errors.      Dangelo Zayas MD  03/19/23 212

## 2023-03-17 NOTE — DISCHARGE INSTRUCTIONS
Take antibiotic as prescribed. Schedule close follow-up with primary care physician, gastroenterology, and Nephrology. Return to ED If Symptoms Worsen or Progress in Any Way.

## 2023-03-17 NOTE — ED NOTES
I have reviewed discharge instructions with the patient. The patient verbalized understanding. Patient left ED via Discharge Method: ambulatory to Home with family). Opportunity for questions and clarification provided. Patient given 2 scripts. To continue your aftercare when you leave the hospital, you may receive an automated call from our care team to check in on how you are doing. This is a free service and part of our promise to provide the best care and service to meet your aftercare needs.  If you have questions, or wish to unsubscribe from this service please call [phone]. Thank you for Choosing our [facility] Emergency Department.         Bashir Valadez RN  03/17/23 3703

## 2023-03-19 LAB
BACTERIA SPEC CULT: NORMAL
SERVICE CMNT-IMP: NORMAL

## 2023-03-22 LAB
BACTERIA SPEC CULT: NORMAL
SERVICE CMNT-IMP: NORMAL

## 2023-03-23 ENCOUNTER — OFFICE VISIT (OUTPATIENT)
Dept: GASTROENTEROLOGY | Age: 83
End: 2023-03-23
Payer: MEDICARE

## 2023-03-23 VITALS
BODY MASS INDEX: 40.22 KG/M2 | WEIGHT: 227 LBS | DIASTOLIC BLOOD PRESSURE: 53 MMHG | OXYGEN SATURATION: 95 % | HEART RATE: 68 BPM | SYSTOLIC BLOOD PRESSURE: 120 MMHG | HEIGHT: 63 IN

## 2023-03-23 DIAGNOSIS — K57.32 DIVERTICULITIS OF LARGE INTESTINE WITHOUT PERFORATION OR ABSCESS WITHOUT BLEEDING: ICD-10-CM

## 2023-03-23 DIAGNOSIS — R93.3 ABNORMAL COMPUTED TOMOGRAPHY OF LARGE INTESTINE: Primary | ICD-10-CM

## 2023-03-23 PROCEDURE — 1123F ACP DISCUSS/DSCN MKR DOCD: CPT | Performed by: INTERNAL MEDICINE

## 2023-03-23 PROCEDURE — G8484 FLU IMMUNIZE NO ADMIN: HCPCS | Performed by: INTERNAL MEDICINE

## 2023-03-23 PROCEDURE — 1090F PRES/ABSN URINE INCON ASSESS: CPT | Performed by: INTERNAL MEDICINE

## 2023-03-23 PROCEDURE — G8400 PT W/DXA NO RESULTS DOC: HCPCS | Performed by: INTERNAL MEDICINE

## 2023-03-23 PROCEDURE — 1036F TOBACCO NON-USER: CPT | Performed by: INTERNAL MEDICINE

## 2023-03-23 PROCEDURE — G8428 CUR MEDS NOT DOCUMENT: HCPCS | Performed by: INTERNAL MEDICINE

## 2023-03-23 PROCEDURE — 3078F DIAST BP <80 MM HG: CPT | Performed by: INTERNAL MEDICINE

## 2023-03-23 PROCEDURE — 3074F SYST BP LT 130 MM HG: CPT | Performed by: INTERNAL MEDICINE

## 2023-03-23 PROCEDURE — 99203 OFFICE O/P NEW LOW 30 MIN: CPT | Performed by: INTERNAL MEDICINE

## 2023-03-23 PROCEDURE — G8417 CALC BMI ABV UP PARAM F/U: HCPCS | Performed by: INTERNAL MEDICINE

## 2023-03-23 RX ORDER — POLYETHYLENE GLYCOL 3350, SODIUM CHLORIDE, POTASSIUM CHLORIDE, SODIUM BICARBONATE, AND SODIUM SULFATE 240; 5.84; 2.98; 6.72; 22.72 G/4L; G/4L; G/4L; G/4L; G/4L
4000 POWDER, FOR SOLUTION ORAL ONCE
Qty: 4000 ML | Refills: 0 | Status: SHIPPED | OUTPATIENT
Start: 2023-03-23 | End: 2023-03-23

## 2023-03-23 ASSESSMENT — ENCOUNTER SYMPTOMS
ABDOMINAL PAIN: 1
CONSTIPATION: 1

## 2023-03-23 NOTE — PROGRESS NOTES
medications 8/23/2016    Encounter for long-term use of antiplatelets/antithrombotics 8/23/2016    Encounter for monitoring of chronic aspirin therapy     Fatigue     GERD (gastroesophageal reflux disease)     no more    Hyperlipidemia 8/23/2016    Hypertension     Hypertension, benign 8/23/2016    Insomnia     Leg swelling 8/23/2016    Lumbar radiculopathy, acute 8/23/2016    Malaise and fatigue 8/23/2016    Muscle cramps 8/23/2016    Nausea 8/23/2016    Neck pain, chronic 8/23/2016    Obesity 8/23/2016    Occlusion and stenosis of carotid artery 9/16/2014    L>R    Otitis externa, chronic 8/23/2016    Pain in right hip 8/23/2016    Pain management contract signed 08/01/2012    Positive D dimer     Postmenopausal related mood disorder 8/23/2016    Proteinuria 8/23/2016    Rhinitis 8/23/2016    Skin lesion     Weight gain 8/23/2016       Past Surgical History:   Procedure Laterality Date    BREAST BIOPSY Right 1961    prior benign right breast biopsy    BREAST BIOPSY Right 2017    rt stereo bx b9    CARPAL TUNNEL RELEASE Right 2017    Dr. Marilynn Mercer Bilateral 10/2008    Dr. Cuenca Nails at [address] [place] [address], [address]      surgery to open fallopian tubes    EMILEE STEROTACTIC LOC BREAST BIOPSY RIGHT Right 5/30/2017    EMILEE STEROTACTIC LOC BREAST BIOPSY RIGHT 5/30/2017 [facility]    ORTHOPEDIC SURGERY Right 2003    hand    OVARIAN CYST REMOVAL  1958    TOTAL HIP ARTHROPLASTY Left 07/03/2007    Total-Biomet Implant-she has a card; had blood transfusion             Allergies   Allergen Reactions    Latex Rash    Alirocumab Other (See Comments)     Runny nose, irritation around the nose    Atorvastatin Other (See Comments)     \"Can't take even low doses\"    Codeine Other (See Comments)     Hives, can't breathe    Ezetimibe Diarrhea    Ezetimibe-Simvastatin Other (See Comments)     Upper abd stomach cramps    Fluvastatin Other (See Comments)     \"also caused troubles\"    Morphine Other (See

## 2023-03-24 ENCOUNTER — PREP FOR PROCEDURE (OUTPATIENT)
Dept: GASTROENTEROLOGY | Age: 83
End: 2023-03-24

## 2023-03-24 PROBLEM — R93.3 ABNORMAL COMPUTED TOMOGRAPHY OF LARGE INTESTINE: Status: ACTIVE | Noted: 2023-03-24

## 2023-03-24 PROBLEM — K57.32 DIVERTICULITIS OF LARGE INTESTINE WITHOUT PERFORATION OR ABSCESS WITHOUT BLEEDING: Status: ACTIVE | Noted: 2023-03-24

## 2023-03-24 RX ORDER — SODIUM CHLORIDE 9 MG/ML
25 INJECTION, SOLUTION INTRAVENOUS PRN
Status: CANCELLED | OUTPATIENT
Start: 2023-03-24

## 2023-03-24 RX ORDER — SODIUM CHLORIDE 0.9 % (FLUSH) 0.9 %
5-40 SYRINGE (ML) INJECTION EVERY 12 HOURS SCHEDULED
Status: CANCELLED | OUTPATIENT
Start: 2023-03-24

## 2023-03-24 RX ORDER — SODIUM CHLORIDE 0.9 % (FLUSH) 0.9 %
5-40 SYRINGE (ML) INJECTION PRN
Status: CANCELLED | OUTPATIENT
Start: 2023-03-24

## 2023-03-30 RX ORDER — LINACLOTIDE 145 UG/1
CAPSULE, GELATIN COATED ORAL
COMMUNITY
Start: 2023-02-20

## 2023-03-30 RX ORDER — ASPIRIN 81 MG/1
TABLET ORAL
COMMUNITY

## 2023-04-19 ENCOUNTER — ANESTHESIA EVENT (OUTPATIENT)
Dept: ENDOSCOPY | Age: 83
End: 2023-04-19
Payer: MEDICARE

## 2023-04-19 ENCOUNTER — TELEPHONE (OUTPATIENT)
Dept: GASTROENTEROLOGY | Age: 83
End: 2023-04-19

## 2023-04-19 NOTE — TELEPHONE ENCOUNTER
Returned call to patient. Patient had questions about upcoming procedure. Questions answered to patient's satisfaction. Informed that someone will be calling her again later today with an arrival time.

## 2023-04-19 NOTE — PROGRESS NOTES
RN called patient to confirm 0745 procedure time with 0615 arrival time,  policy, and location ([address] Patient verbalized understanding.

## 2023-04-20 ENCOUNTER — ANESTHESIA (OUTPATIENT)
Dept: ENDOSCOPY | Age: 83
End: 2023-04-20
Payer: MEDICARE

## 2023-04-20 ENCOUNTER — HOSPITAL ENCOUNTER (OUTPATIENT)
Age: 83
Setting detail: OUTPATIENT SURGERY
Discharge: HOME | End: 2023-04-20
Attending: INTERNAL MEDICINE | Admitting: INTERNAL MEDICINE
Payer: MEDICARE

## 2023-04-20 VITALS
HEIGHT: 64 IN | HEART RATE: 67 BPM | OXYGEN SATURATION: 84 % | TEMPERATURE: 98.6 F | DIASTOLIC BLOOD PRESSURE: 74 MMHG | BODY MASS INDEX: 37.73 KG/M2 | WEIGHT: 221 LBS | RESPIRATION RATE: 15 BRPM | SYSTOLIC BLOOD PRESSURE: 186 MMHG

## 2023-04-20 DIAGNOSIS — K57.32 DIVERTICULITIS OF LARGE INTESTINE WITHOUT PERFORATION OR ABSCESS WITHOUT BLEEDING: ICD-10-CM

## 2023-04-20 DIAGNOSIS — R93.3 ABNORMAL COMPUTED TOMOGRAPHY OF LARGE INTESTINE: ICD-10-CM

## 2023-04-20 PROCEDURE — 7100000011 HC PHASE II RECOVERY - ADDTL 15 MIN: Performed by: INTERNAL MEDICINE

## 2023-04-20 PROCEDURE — 3700000001 HC ADD 15 MINUTES (ANESTHESIA): Performed by: INTERNAL MEDICINE

## 2023-04-20 PROCEDURE — 3700000000 HC ANESTHESIA ATTENDED CARE: Performed by: INTERNAL MEDICINE

## 2023-04-20 PROCEDURE — 6360000002 HC RX W HCPCS

## 2023-04-20 PROCEDURE — 7100000010 HC PHASE II RECOVERY - FIRST 15 MIN: Performed by: INTERNAL MEDICINE

## 2023-04-20 PROCEDURE — 45380 COLONOSCOPY AND BIOPSY: CPT | Performed by: INTERNAL MEDICINE

## 2023-04-20 PROCEDURE — 88305 TISSUE EXAM BY PATHOLOGIST: CPT

## 2023-04-20 PROCEDURE — 3609009300 HC COLONOSCOPY BIOPSY/STOMA: Performed by: INTERNAL MEDICINE

## 2023-04-20 PROCEDURE — 2709999900 HC NON-CHARGEABLE SUPPLY: Performed by: INTERNAL MEDICINE

## 2023-04-20 PROCEDURE — 2580000003 HC RX 258: Performed by: STUDENT IN AN ORGANIZED HEALTH CARE EDUCATION/TRAINING PROGRAM

## 2023-04-20 PROCEDURE — 2500000003 HC RX 250 WO HCPCS

## 2023-04-20 RX ORDER — SODIUM CHLORIDE, SODIUM LACTATE, POTASSIUM CHLORIDE, CALCIUM CHLORIDE 600; 310; 30; 20 MG/100ML; MG/100ML; MG/100ML; MG/100ML
INJECTION, SOLUTION INTRAVENOUS CONTINUOUS
Status: DISCONTINUED | OUTPATIENT
Start: 2023-04-20 | End: 2023-04-20 | Stop reason: HOSPADM

## 2023-04-20 RX ORDER — SODIUM CHLORIDE 0.9 % (FLUSH) 0.9 %
5-40 SYRINGE (ML) INJECTION EVERY 12 HOURS SCHEDULED
Status: DISCONTINUED | OUTPATIENT
Start: 2023-04-20 | End: 2023-04-20 | Stop reason: HOSPADM

## 2023-04-20 RX ORDER — PROPOFOL 10 MG/ML
INJECTION, EMULSION INTRAVENOUS CONTINUOUS PRN
Status: DISCONTINUED | OUTPATIENT
Start: 2023-04-20 | End: 2023-04-20 | Stop reason: SDUPTHER

## 2023-04-20 RX ORDER — KRILL/OM-3/DHA/EPA/PHOSPHO/AST 1000-230MG
CAPSULE ORAL
COMMUNITY

## 2023-04-20 RX ORDER — PROPOFOL 10 MG/ML
INJECTION, EMULSION INTRAVENOUS PRN
Status: DISCONTINUED | OUTPATIENT
Start: 2023-04-20 | End: 2023-04-20 | Stop reason: SDUPTHER

## 2023-04-20 RX ORDER — LIDOCAINE HYDROCHLORIDE 10 MG/ML
1 INJECTION, SOLUTION INFILTRATION; PERINEURAL
Status: DISCONTINUED | OUTPATIENT
Start: 2023-04-20 | End: 2023-04-20 | Stop reason: HOSPADM

## 2023-04-20 RX ORDER — SODIUM CHLORIDE 0.9 % (FLUSH) 0.9 %
5-40 SYRINGE (ML) INJECTION PRN
Status: DISCONTINUED | OUTPATIENT
Start: 2023-04-20 | End: 2023-04-20 | Stop reason: HOSPADM

## 2023-04-20 RX ORDER — SODIUM CHLORIDE 9 MG/ML
INJECTION, SOLUTION INTRAVENOUS PRN
Status: DISCONTINUED | OUTPATIENT
Start: 2023-04-20 | End: 2023-04-20 | Stop reason: HOSPADM

## 2023-04-20 RX ORDER — SODIUM CHLORIDE 9 MG/ML
25 INJECTION, SOLUTION INTRAVENOUS PRN
Status: DISCONTINUED | OUTPATIENT
Start: 2023-04-20 | End: 2023-04-20 | Stop reason: HOSPADM

## 2023-04-20 RX ORDER — METOPROLOL TARTRATE 5 MG/5ML
INJECTION INTRAVENOUS PRN
Status: DISCONTINUED | OUTPATIENT
Start: 2023-04-20 | End: 2023-04-20 | Stop reason: SDUPTHER

## 2023-04-20 RX ORDER — LIDOCAINE HYDROCHLORIDE 20 MG/ML
INJECTION, SOLUTION EPIDURAL; INFILTRATION; INTRACAUDAL; PERINEURAL PRN
Status: DISCONTINUED | OUTPATIENT
Start: 2023-04-20 | End: 2023-04-20 | Stop reason: SDUPTHER

## 2023-04-20 RX ADMIN — LIDOCAINE HYDROCHLORIDE 40 MG: 20 INJECTION, SOLUTION EPIDURAL; INFILTRATION; INTRACAUDAL; PERINEURAL at 08:01

## 2023-04-20 RX ADMIN — PROPOFOL 140 MCG/KG/MIN: 10 INJECTION, EMULSION INTRAVENOUS at 08:02

## 2023-04-20 RX ADMIN — METOPROLOL TARTRATE 1 MG: 1 INJECTION, SOLUTION INTRAVENOUS at 08:20

## 2023-04-20 RX ADMIN — SODIUM CHLORIDE, POTASSIUM CHLORIDE, SODIUM LACTATE AND CALCIUM CHLORIDE: 600; 310; 30; 20 INJECTION, SOLUTION INTRAVENOUS at 07:28

## 2023-04-20 RX ADMIN — PROPOFOL 50 MG: 10 INJECTION, EMULSION INTRAVENOUS at 08:01

## 2023-04-20 ASSESSMENT — PAIN - FUNCTIONAL ASSESSMENT
PAIN_FUNCTIONAL_ASSESSMENT: NONE - DENIES PAIN
PAIN_FUNCTIONAL_ASSESSMENT: 0-10

## 2023-04-20 ASSESSMENT — PAIN DESCRIPTION - DESCRIPTORS: DESCRIPTORS: ACHING

## 2023-04-20 NOTE — PROGRESS NOTES
Patient and her sister voiced understanding of discharge and follow up instructions  will be discharged by wheelchair

## 2023-04-20 NOTE — DISCHARGE INSTRUCTIONS
Gastrointestinal Colonoscopy/Flexible Sigmoidoscopy - Lower Exam Discharge Instructions  Call Dr. Ron Villanueva  [phone] any problems or questions. Contact the doctors office for follow up appointment as directed  Medication may cause drowsiness for several hours, therefore, do not drive or operate machinery for remainder of the day. No alcohol today. Do not make any important decisions such signing legal paperwork. Ordinarily, you may resume regular diet and activity after exam unless otherwise specified by your physician. Because of air put into your colon during exam, you may experience some abdominal distension, relieved by the passage of gas, for several hours. Contact your physician if you have any of the following:  Excessive amount of bleeding - large amount when having a bowel movement.   Occasional specks of blood with bowel movement would not be unusual.  Severe abdominal pain  Fever or Chills      Any additional instructions:   Call dr Marti Gregorio office for follow up appointment

## 2023-04-20 NOTE — ANESTHESIA PRE PROCEDURE
comment: Some dyspnea with exertion   Cardiovascular:    (+) hypertension:, CHF: diastolic,         Rhythm: regular  Rate: normal                    Neuro/Psych:               GI/Hepatic/Renal:   (+) GERD:, renal disease: CRI,           Endo/Other:                     Abdominal:             Vascular: Other Findings:           Anesthesia Plan      TIVA     ASA 3     (77 yo F, ASA 3, hx of HFpEF, HTN, ELIZABETH, CRI, undergoing colonoscopy. Plan for TIVA)  Induction: intravenous. Anesthetic plan and risks discussed with patient.                         Amy Mcguire MD   4/19/2023

## 2023-04-20 NOTE — PROCEDURES
Operative Report    Patient: Shoshana Primrose MRN: [id number]      YOB: 1940  Age: 78 y.o. Sex: female            Indications: Change in bowel habits. Abnormal CT scan of the [email or web address]     Preoperative Evaluation: The patient was evaluated prior to the procedure in the GI lab admission area, the patient ASA was recorded . Consent was obtained from the patient with the risk of perforation bleeding and aspiration. Anesthesia: BAYRON-per anesthesia    Complications: None; patient tolerated the procedure well. EBL -insignificant      Procedure: The patient was sedated in the left lateral decubitus position. Scope was advanced from the rectum to the ileum. Evaluation was performed to the cecum twice. The scope was withdrawn to the rectum, retroflexed view was performed. The rectal exam was normal.  Preparation was good [place] score of 3/3/2:8 . Findings:   Exam to the ileum. Normal mucosa. 2 cecal ulceration benign looking 1-2 cm in size covered with yellow fibrin biopsy of the ulcer edges were taken. Normal a sending transverse colon. Severe left-sided diverticulosis with severe tortuosity and area of mild narrowing of the descending colon. 2 L sigmoid polyp that was removed with cold biopsy external narrowing noted on digital exam      Postoperative Diagnosis: 1-benign cecal ulcers pathology. 2-severe diverticulosis. 3-sigmoid polyp    [address]52 Colonoscopy, Flexible; with biopsy, single or multiple      Recommendations:   - Await pathology.       Signed By:  Fei Mirza MD     April 20, 2023

## 2023-04-20 NOTE — INTERVAL H&P NOTE
Update History & Physical    The patient's History and Physical of March 23,  was reviewed with the patient and I examined the patient. There was no change. The surgical site was confirmed by the patient and me. Plan: The risks, benefits, expected outcome, and alternative to the recommended procedure have been discussed with the patient. Patient understands and wants to proceed with the procedure.      Electronically signed by Vicente Rivera MD on 4/20/2023 at 7:37 AM

## 2023-04-20 NOTE — ANESTHESIA POSTPROCEDURE EVALUATION
Department of Anesthesiology  Postprocedure Note    Patient: James Bocanegra  MRN: [id number]  [place]: 1940  Date of evaluation: 4/20/2023      Procedure Summary     Date: 04/20/23 Room / Location: [facility] ENDO 05 / [facility] ENDOSCOPY    Anesthesia Start: 0754 Anesthesia Stop: 9171    Procedure: COLONOSCOPY BIOPSY (Lower GI Region) Diagnosis:       Abnormal computed tomography of large intestine      Diverticulitis of large intestine without perforation or abscess without bleeding      (Abnormal computed tomography of large intestine [R93.3])      (Diverticulitis of large intestine without perforation or abscess without bleeding [K57.32])    Surgeons: Batool Cheek MD Responsible Provider: Divya Pope MD    Anesthesia Type: TIVA ASA Status: 3          Anesthesia Type: TIVA    Gerardo Phase I: Gerardo Score: 10    Gerardo Phase II: Gerardo Score: 10      Anesthesia Post Evaluation    Patient location during evaluation: PACU  Patient participation: complete - patient participated  Level of consciousness: awake  Pain score: 0  Airway patency: patent  Nausea & Vomiting: no nausea and no vomiting  Complications: no  Cardiovascular status: blood pressure returned to baseline  Respiratory status: acceptable  Hydration status: euvolemic

## 2023-05-02 ENCOUNTER — TELEPHONE (OUTPATIENT)
Dept: GASTROENTEROLOGY | Age: 83
End: 2023-05-02

## 2023-05-02 RX ORDER — METRONIDAZOLE 500 MG/1
500 TABLET ORAL 2 TIMES DAILY
Qty: 14 TABLET | Refills: 0 | Status: SHIPPED | OUTPATIENT
Start: 2023-05-02 | End: 2023-05-09

## 2023-10-30 ENCOUNTER — TRANSCRIBE ORDERS (OUTPATIENT)
Dept: SCHEDULING | Age: 83
End: 2023-10-30

## 2023-10-30 DIAGNOSIS — Z12.31 SCREENING MAMMOGRAM FOR HIGH-RISK PATIENT: Primary | ICD-10-CM

## 2023-12-26 ENCOUNTER — HOSPITAL ENCOUNTER (OUTPATIENT)
Dept: MAMMOGRAPHY | Age: 83
Discharge: HOME | End: 2023-12-29

## 2023-12-26 DIAGNOSIS — Z12.31 SCREENING MAMMOGRAM FOR HIGH-RISK PATIENT: ICD-10-CM

## 2024-05-21 ENCOUNTER — OFFICE VISIT (OUTPATIENT)
Age: 84
End: 2024-05-21
Payer: MEDICARE

## 2024-05-21 DIAGNOSIS — M79.642 BILATERAL HAND PAIN: ICD-10-CM

## 2024-05-21 DIAGNOSIS — M19.049 HAND ARTHRITIS: ICD-10-CM

## 2024-05-21 DIAGNOSIS — M79.641 BILATERAL HAND PAIN: ICD-10-CM

## 2024-05-21 DIAGNOSIS — M65.9 SYNOVITIS AND TENOSYNOVITIS: ICD-10-CM

## 2024-05-21 DIAGNOSIS — M19.039 WRIST ARTHRITIS: Primary | ICD-10-CM

## 2024-05-21 PROCEDURE — G8399 PT W/DXA RESULTS DOCUMENT: HCPCS | Performed by: ORTHOPAEDIC SURGERY

## 2024-05-21 PROCEDURE — 1036F TOBACCO NON-USER: CPT | Performed by: ORTHOPAEDIC SURGERY

## 2024-05-21 PROCEDURE — G8427 DOCREV CUR MEDS BY ELIG CLIN: HCPCS | Performed by: ORTHOPAEDIC SURGERY

## 2024-05-21 PROCEDURE — 1123F ACP DISCUSS/DSCN MKR DOCD: CPT | Performed by: ORTHOPAEDIC SURGERY

## 2024-05-21 PROCEDURE — 99204 OFFICE O/P NEW MOD 45 MIN: CPT | Performed by: ORTHOPAEDIC SURGERY

## 2024-05-21 PROCEDURE — 1090F PRES/ABSN URINE INCON ASSESS: CPT | Performed by: ORTHOPAEDIC SURGERY

## 2024-05-21 PROCEDURE — G8421 BMI NOT CALCULATED: HCPCS | Performed by: ORTHOPAEDIC SURGERY

## 2024-05-22 NOTE — PROGRESS NOTES
C-Reactive Protein     Comprehensive Metabolic Panel     CBC with Auto Differential     Rheumatoid Factor     CANCELED: Rheumatoid Factor     CANCELED: Cyclic Citrul Peptide Antibody, IgG     CANCELED: CBC with Auto Differential     CANCELED: Comprehensive Metabolic Panel     CANCELED: C-Reactive Protein     CANCELED: Uric Acid     CANCELED: YAKELIN      2. Bilateral hand pain  M79.641 XR HAND BILATERAL MINIMUM 3 VW    M79.642 Ambulatory Referral to [facility]     CANCELED: Rheumatoid Factor     CANCELED: Cyclic Citrul Peptide Antibody, IgG     CANCELED: CBC with Auto Differential     CANCELED: Comprehensive Metabolic Panel     CANCELED: C-Reactive Protein     CANCELED: Uric Acid     CANCELED: YAKELIN      3. Hand arthritis  M19.049 Ambulatory Referral to [facility]     CCP Antibodies, IGG/IGA     Sedimentation Rate     YAKELIN, Direct, w/Reflex     Uric Acid     C-Reactive Protein     Comprehensive Metabolic Panel     CBC with Auto Differential     Rheumatoid Factor     CANCELED: Rheumatoid Factor     CANCELED: Cyclic Citrul Peptide Antibody, IgG     CANCELED: CBC with Auto Differential     CANCELED: Comprehensive Metabolic Panel     CANCELED: C-Reactive Protein     CANCELED: Uric Acid     CANCELED: YAKELIN      4. Synovitis and tenosynovitis  M65.9 Ambulatory Referral to [facility]     CCP Antibodies, IGG/IGA     Sedimentation Rate     YAKELIN, Direct, w/Reflex     Uric Acid     C-Reactive Protein     Comprehensive Metabolic Panel     CBC with Auto Differential     Rheumatoid Factor     CANCELED: Rheumatoid Factor     CANCELED: Cyclic Citrul Peptide Antibody, IgG     CANCELED: CBC with Auto Differential     CANCELED: Comprehensive Metabolic Panel     CANCELED: C-Reactive Protein     CANCELED: Uric Acid     CANCELED: YAKELIN          Plan:   We discussed the diagnosis and different treatment options. We discussed observation, therapy, antiinflammatory medications and other pertinent treatment modalities. I discussed that right wrist swelling is a combination

## 2024-07-29 ENCOUNTER — TELEPHONE (OUTPATIENT)
Dept: ORTHOPEDIC SURGERY | Age: 84
End: 2024-07-29

## 2024-07-29 NOTE — TELEPHONE ENCOUNTER
Patient had hip replacement in 2007, needs to know what type metal was used because she is needing to know if she can have a mri done

## 2024-07-30 ENCOUNTER — HOSPITAL ENCOUNTER (OUTPATIENT)
Dept: MRI IMAGING | Age: 84
Discharge: HOME | End: 2024-08-02
Payer: OTHER MISCELLANEOUS

## 2024-07-30 DIAGNOSIS — M25.512 LEFT SHOULDER PAIN, UNSPECIFIED CHRONICITY: ICD-10-CM

## 2024-07-30 PROCEDURE — 73221 MRI JOINT UPR EXTREM W/O DYE: CPT

## 2025-01-24 ENCOUNTER — OFFICE VISIT (OUTPATIENT)
Dept: ORTHOPEDIC SURGERY | Age: 85
End: 2025-01-24
Payer: MEDICARE

## 2025-01-24 DIAGNOSIS — M17.12 OSTEOARTHRITIS OF LEFT KNEE, UNSPECIFIED OSTEOARTHRITIS TYPE: ICD-10-CM

## 2025-01-24 DIAGNOSIS — M25.562 LEFT KNEE PAIN, UNSPECIFIED CHRONICITY: ICD-10-CM

## 2025-01-24 DIAGNOSIS — M25.552 LEFT HIP PAIN: Primary | ICD-10-CM

## 2025-01-24 DIAGNOSIS — M16.11 PRIMARY OSTEOARTHRITIS OF RIGHT HIP: ICD-10-CM

## 2025-01-24 DIAGNOSIS — M47.816 LUMBAR SPONDYLOSIS: ICD-10-CM

## 2025-01-24 PROCEDURE — 99204 OFFICE O/P NEW MOD 45 MIN: CPT | Performed by: PHYSICIAN ASSISTANT

## 2025-01-24 PROCEDURE — G8428 CUR MEDS NOT DOCUMENT: HCPCS | Performed by: PHYSICIAN ASSISTANT

## 2025-01-24 PROCEDURE — G8421 BMI NOT CALCULATED: HCPCS | Performed by: PHYSICIAN ASSISTANT

## 2025-01-24 PROCEDURE — 1036F TOBACCO NON-USER: CPT | Performed by: PHYSICIAN ASSISTANT

## 2025-01-24 PROCEDURE — 1123F ACP DISCUSS/DSCN MKR DOCD: CPT | Performed by: PHYSICIAN ASSISTANT

## 2025-01-24 PROCEDURE — 20611 DRAIN/INJ JOINT/BURSA W/US: CPT | Performed by: PHYSICIAN ASSISTANT

## 2025-01-24 PROCEDURE — 1090F PRES/ABSN URINE INCON ASSESS: CPT | Performed by: PHYSICIAN ASSISTANT

## 2025-01-24 RX ORDER — TRIAMCINOLONE ACETONIDE 40 MG/ML
40 INJECTION, SUSPENSION INTRA-ARTICULAR; INTRAMUSCULAR ONCE
Status: COMPLETED | OUTPATIENT
Start: 2025-01-24 | End: 2025-01-24

## 2025-01-24 RX ADMIN — TRIAMCINOLONE ACETONIDE 40 MG: 40 INJECTION, SUSPENSION INTRA-ARTICULAR; INTRAMUSCULAR at 15:48

## 2025-01-24 NOTE — PROGRESS NOTES
RELEASE Right     Dr. Zamora    CATARACT REMOVAL Bilateral 10/2008    Dr. Richardson at [facility]    CHOLECYSTECTOMY, LAPAROSCOPIC      COLONOSCOPY N/A 2023    COLONOSCOPY BIOPSY/Polyp performed by Caleb Elise MD at [facility] ENDOSCOPY    GYN      surgery to open fallopian tubes    EMILEE STEREO BREAST BX W LOC DEVICE 1ST LESION RIGHT Right 2017    EMILEE STEROTACTIC LOC BREAST BIOPSY RIGHT 2017 [facility]    ORTHOPEDIC SURGERY Right     hand    OVARIAN CYST REMOVAL      TOTAL HIP ARTHROPLASTY Left 2007    Total-Biomet Implant-she has a card; had blood transfusion     Family History   Problem Relation Age of Onset    Heart Disease Mother         CHF    Hypertension Mother     Stroke Mother         age 78    Cancer Father     Hypertension Sister     Hypertension Brother     Breast Cancer Neg Hx      Social History     Socioeconomic History    Marital status:      Spouse name: Not on file    Number of children: Not on file    Years of education: Not on file    Highest education level: Not on file   Occupational History    Not on file   Tobacco Use    Smoking status: Former     Current packs/day: 0.00     Types: Cigarettes     Quit date: 2010     Years since quittin.3    Smokeless tobacco: Never    Tobacco comments:     Quit smokin pack year history   Vaping Use    Vaping status: Never Used   Substance and Sexual Activity    Alcohol use: No    Drug use: No    Sexual activity: Not on file   Other Topics Concern    Not on file   Social History Narrative    Not on file     Social Determinants of Health     Financial Resource Strain: Not on file   Food Insecurity: Not on file   Transportation Needs: Not on file   Physical Activity: Not on file (3/19/2021)   Stress: Not on file   Social Connections: Unknown (3/19/2021)    [facility]    Social Connections     Frequency of Communication with Friends and Family: Not asked     Frequency of Social

## 2025-03-04 DIAGNOSIS — M17.12 OSTEOARTHRITIS OF LEFT KNEE, UNSPECIFIED OSTEOARTHRITIS TYPE: ICD-10-CM

## 2025-03-04 DIAGNOSIS — M25.562 LEFT KNEE PAIN, UNSPECIFIED CHRONICITY: Primary | ICD-10-CM

## 2025-03-10 ENCOUNTER — OFFICE VISIT (OUTPATIENT)
Age: 85
End: 2025-03-10
Payer: MEDICARE

## 2025-03-10 VITALS — WEIGHT: 222 LBS | HEIGHT: 64 IN | BODY MASS INDEX: 37.9 KG/M2

## 2025-03-10 DIAGNOSIS — S52.614A CLOSED NONDISPLACED FRACTURE OF STYLOID PROCESS OF RIGHT ULNA, INITIAL ENCOUNTER: ICD-10-CM

## 2025-03-10 DIAGNOSIS — M25.531 RIGHT WRIST PAIN: Primary | ICD-10-CM

## 2025-03-10 PROCEDURE — 1090F PRES/ABSN URINE INCON ASSESS: CPT | Performed by: ORTHOPAEDIC SURGERY

## 2025-03-10 PROCEDURE — G8428 CUR MEDS NOT DOCUMENT: HCPCS | Performed by: ORTHOPAEDIC SURGERY

## 2025-03-10 PROCEDURE — 99214 OFFICE O/P EST MOD 30 MIN: CPT | Performed by: ORTHOPAEDIC SURGERY

## 2025-03-10 PROCEDURE — 1036F TOBACCO NON-USER: CPT | Performed by: ORTHOPAEDIC SURGERY

## 2025-03-10 PROCEDURE — 1123F ACP DISCUSS/DSCN MKR DOCD: CPT | Performed by: ORTHOPAEDIC SURGERY

## 2025-03-10 PROCEDURE — G8417 CALC BMI ABV UP PARAM F/U: HCPCS | Performed by: ORTHOPAEDIC SURGERY

## 2025-03-10 NOTE — PROGRESS NOTES
Orthopaedic Hand Clinic Note    Name: Amber Montano  YOB: 1940  Gender: female  MRN: [id number]      CC: Patient referred for evaluation of upper extremity pain    HPI: Amber Montano is a 84 y.o. female with a chief complaint of right wrist injury.  Patient states that she does not know how she fell but few weeks ago she fell at home and was found by a family member after being on the floor for 2 days.  [facility]  X-rays were taken of the wrist.  She was placed in a brace.  Pain has been improving.  She says she has numbness in the tips of the thumb index and middle fingers.      ROS/Meds/PSH/PMH/FH/SH: I personally reviewed the patients standard intake form.  Pertinents are discussed in the HPI    Physical Examination:    Musculoskeletal Exam:  Examination on the right upper extremity demonstrates cap refill < 5 seconds in all fingers, skin is intact, there is mild tenderness to palpation of the ulnar styloid.  Plan of maximal tenderness is actually over the radial aspect of the carpus.  She has arthritic deformities involving the interphalangeal joints.  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  Light touch sensation is diminished in the median nerve distribution, Durkan and Phalen's test are negative..    Imaging / Electrodiagnostic Tests:     I independently reviewed and interpreted right wrist radiographs.  They demonstrate small nondisplaced ulnar styloid fracture, severe radiocarpal arthritis    Wrist XR: AP, Lateral, Oblique views     Clinical Indication:  1. Right wrist pain    2. Closed nondisplaced fracture of styloid process of right ulna, initial encounter           Report: AP, lateral, oblique x-ray of the right wrist demonstrates small nondisplaced ulnar styloid fracture, severe radiocarpal arthritis. No evidence of distal radius fracture    Impression: as above     Lisa David MD           Assessment:     ICD-10-CM    1. Right

## 2025-03-14 ENCOUNTER — TELEPHONE (OUTPATIENT)
Age: 85
End: 2025-03-14

## 2025-03-14 NOTE — TELEPHONE ENCOUNTER
I called and left a VM for she can keep the follow up appointment with Dr. David on 4/7/20205 if she needs it, but that if she's doing well she can call and cancel it.

## 2025-03-24 ENCOUNTER — OFFICE VISIT (OUTPATIENT)
Dept: ORTHOPEDIC SURGERY | Age: 85
End: 2025-03-24
Payer: MEDICARE

## 2025-03-24 DIAGNOSIS — M17.12 OSTEOARTHRITIS OF LEFT KNEE, UNSPECIFIED OSTEOARTHRITIS TYPE: Primary | ICD-10-CM

## 2025-03-24 PROCEDURE — G8417 CALC BMI ABV UP PARAM F/U: HCPCS | Performed by: PHYSICIAN ASSISTANT

## 2025-03-24 PROCEDURE — 99213 OFFICE O/P EST LOW 20 MIN: CPT | Performed by: PHYSICIAN ASSISTANT

## 2025-03-24 PROCEDURE — 1090F PRES/ABSN URINE INCON ASSESS: CPT | Performed by: PHYSICIAN ASSISTANT

## 2025-03-24 PROCEDURE — 1036F TOBACCO NON-USER: CPT | Performed by: PHYSICIAN ASSISTANT

## 2025-03-24 PROCEDURE — 1123F ACP DISCUSS/DSCN MKR DOCD: CPT | Performed by: PHYSICIAN ASSISTANT

## 2025-03-24 PROCEDURE — G8428 CUR MEDS NOT DOCUMENT: HCPCS | Performed by: PHYSICIAN ASSISTANT

## 2025-03-24 NOTE — PROGRESS NOTES
tablet, Take 5 mg by mouth daily (Patient not taking: Reported on 3/30/2023), Disp: , Rfl:     vitamin D (CHOLECALCIFEROL) 25 MCG (1000 UT) TABS tablet, Take 1 tablet by mouth daily, Disp: , Rfl:     furosemide (LASIX) 40 MG tablet, Take 1 tablet by mouth daily, Disp: , Rfl:     HYDROcodone-acetaminophen (NORCO)  MG per tablet, Take 2 tablets by mouth every 6 hours as needed., Disp: , Rfl:     metoprolol tartrate (LOPRESSOR) 25 MG tablet, Take 1 tablet by mouth 2 times daily, Disp: , Rfl:     naloxone 4 MG/0.1ML LIQD nasal spray, Use 1 spray intranasally into 1 nostril. Use a new Narcan nasal spray for subsequent doses and administer into alternating nostrils. May repeat every 2 to 3 minutes as needed., Disp: , Rfl:     nitroGLYCERIN (NITROSTAT) 0.4 MG SL tablet, Place 1 tablet under the tongue, Disp: , Rfl:     nystatin (MYCOSTATIN) 769018 UNIT/GM powder, Apply topically 4 times daily, Disp: , Rfl:     ondansetron (ZOFRAN) 4 MG tablet, Take 1 tablet by mouth 3 times daily as needed, Disp: , Rfl:     pitavastatin (LIVALO) 2 MG TABS tablet, Take 2 mg by mouth daily (Patient not taking: Reported on 3/30/2023), Disp: , Rfl:   Allergies   Allergen Reactions    Latex Rash    Alirocumab Other (See Comments)     Runny nose, irritation around the nose    Atorvastatin Other (See Comments)     \"Can't take even low doses\"    Codeine Other (See Comments)     Hives, can't breathe    Ezetimibe Diarrhea    Ezetimibe-Simvastatin Other (See Comments)     Upper abd stomach cramps    Fluvastatin Other (See Comments)     \"also caused troubles\"    Morphine Other (See Comments)     Pt states it makes her act crazy    Oxybutynin Chloride Other (See Comments)     \"Dried mouth for 1 week\"    Ramipril Other (See Comments)    Simvastatin Other (See Comments)     Abd cramps    Telmisartan Other (See Comments)    Zolpidem Other (See Comments)     Bad dreams     Past Medical History:   Diagnosis Date    Abnormal CXR 8/23/2016    Abnormal

## (undated) DEVICE — CONNECTOR TBNG OD5-7MM O2 END DISP

## (undated) DEVICE — CANNULA NSL ORAL AD FOR CAPNOFLEX CO2 O2 AIRLFE

## (undated) DEVICE — SINGLE PORT MANIFOLD: Brand: NEPTUNE 2

## (undated) DEVICE — AIRLIFE™ OXYGEN TUBING 7 FEET (2.1 M) CRUSH RESISTANT OXYGEN TUBING, VINYL TIPPED: Brand: AIRLIFE™

## (undated) DEVICE — DISPOSABLE TOURNIQUET CUFF SINGLE BLADDER, DUAL PORT AND QUICK CONNECT CONNECTOR: Brand: COLOR CUFF

## (undated) DEVICE — KENDALL RADIOLUCENT FOAM MONITORING ELECTRODE RECTANGULAR SHAPE: Brand: KENDALL

## (undated) DEVICE — DRAPE,HAND,STERILE: Brand: MEDLINE

## (undated) DEVICE — Device

## (undated) DEVICE — SYRINGE, LUER SLIP, STERILE, 60ML: Brand: MEDLINE

## (undated) DEVICE — BNDG SOF-FORM 2X75 STRL LF --

## (undated) DEVICE — SYRINGE MED 3ML CLR PLAS STD N CTRL LUERLOCK TIP DISP

## (undated) DEVICE — (D)PREP SKN CHLRAPRP APPL 26ML -- CONVERT TO ITEM 371833

## (undated) DEVICE — SUTURE VCRL RAPIDE SZ 4-0 L18IN ABSRB UD L19MM PS-2 1/2 CIR VR496

## (undated) DEVICE — NEEDLE HYPO 25GA L1.5IN BLU POLYPR HUB S STL REG BVL STR

## (undated) DEVICE — SURGICAL PROCEDURE PACK BASIC ST FRANCIS

## (undated) DEVICE — AMD ANTIMICROBIAL GAUZE SPONGES,12 PLY USP TYPE VII, 0.2% POLYHEXAMETHYLENE BIGUANIDE HCI (PHMB): Brand: CURITY

## (undated) DEVICE — CONTAINER FORMALIN PREFILLED 10% NBF 60ML

## (undated) DEVICE — YANKAUER,BULB TIP,W/O VENT,RIGID,STERILE: Brand: MEDLINE

## (undated) DEVICE — (D)SYR 10ML 1/5ML GRAD NSAF -- PKGING CHANGE USE ITEM 338027

## (undated) DEVICE — NEEDLE SYR 18GA L1.5IN RED PLAS HUB S STL BLNT FILL W/O

## (undated) DEVICE — LUBE JELLY FOIL PACK 1.4 OZ: Brand: MEDLINE INDUSTRIES, INC.

## (undated) DEVICE — SOLUTION IV 1000ML 0.9% SOD CHL

## (undated) DEVICE — DERMABOND SKIN ADH 0.7ML -- DERMABOND ADVANCED 12/BX

## (undated) DEVICE — SYRINGE MED 10ML LUERLOCK TIP W/O SFTY DISP

## (undated) DEVICE — FORCEPS BX L240CM JAW DIA2.8MM L CAP W/ NDL MIC MESH TOOTH

## (undated) DEVICE — GAUZE,SPONGE,4"X4",12PLY,WOVEN,NS,LF: Brand: MEDLINE

## (undated) DEVICE — INSTRUMENT ORTH L6IN LNG S STL SGL IN TOME

## (undated) DEVICE — DRAPE, FILM SHEET, 44X65 STERILE: Brand: MEDLINE